# Patient Record
Sex: FEMALE | Race: BLACK OR AFRICAN AMERICAN | NOT HISPANIC OR LATINO | Employment: FULL TIME | URBAN - METROPOLITAN AREA
[De-identification: names, ages, dates, MRNs, and addresses within clinical notes are randomized per-mention and may not be internally consistent; named-entity substitution may affect disease eponyms.]

---

## 2023-06-05 ENCOUNTER — TELEPHONE (OUTPATIENT)
Dept: NEUROSURGERY | Facility: CLINIC | Age: 63
End: 2023-06-05
Payer: COMMERCIAL

## 2023-06-05 NOTE — TELEPHONE ENCOUNTER
Received [facility] [place] referral. Awaiting medical records and imaging.    Keiry Castañeda, RN, CMSRN  RN Navigator  [facility]

## 2023-06-23 ENCOUNTER — TELEPHONE (OUTPATIENT)
Dept: NEUROSURGERY | Facility: CLINIC | Age: 63
End: 2023-06-23
Payer: COMMERCIAL

## 2023-06-29 ENCOUNTER — TELEPHONE (OUTPATIENT)
Dept: SPINE | Facility: CLINIC | Age: 63
End: 2023-06-29
Payer: COMMERCIAL

## 2023-06-30 ENCOUNTER — TELEPHONE (OUTPATIENT)
Dept: NEUROSURGERY | Facility: CLINIC | Age: 63
End: 2023-06-30
Payer: COMMERCIAL

## 2023-06-30 RX ORDER — ESTRADIOL 2 MG/1
2 TABLET ORAL DAILY
COMMUNITY
Start: 2023-06-15

## 2023-06-30 RX ORDER — MELOXICAM 15 MG/1
15 TABLET ORAL DAILY
COMMUNITY
Start: 2023-06-26
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
## 2023-07-11 ENCOUNTER — TELEPHONE (OUTPATIENT)
Dept: NEUROSURGERY | Facility: CLINIC | Age: 63
End: 2023-07-11
Payer: COMMERCIAL

## 2023-07-11 NOTE — TELEPHONE ENCOUNTER
spoke to patient who stated she spoke to her primary care doctor Dr. Macedo office and they will agree to participate in [facility] program. Will fax PCP letter to Dr. Alhaji Macedo  [phone]/ fax [phone]- once received will schedule to bring patient in for evaluation.    Keiry Castañeda, RN, CMSRN  RN Navigator  [facility]

## 2023-07-20 ENCOUNTER — TELEPHONE (OUTPATIENT)
Dept: NEUROSURGERY | Facility: CLINIC | Age: 63
End: 2023-07-20
Payer: COMMERCIAL

## 2023-07-24 ENCOUNTER — TELEPHONE (OUTPATIENT)
Dept: NEUROSURGERY | Facility: CLINIC | Age: 63
End: 2023-07-24
Payer: COMMERCIAL

## 2023-07-25 ENCOUNTER — TELEPHONE (OUTPATIENT)
Dept: ORTHOPEDICS | Facility: CLINIC | Age: 63
End: 2023-07-25
Payer: COMMERCIAL

## 2023-07-25 NOTE — TELEPHONE ENCOUNTER
Spoke to patient about [facility] spine initial eval dates. Offered 8/6-8/9 and 8/8-8/12. Patient would like to come in 8/8-8/12. Patient will drive. Will submit POC to [employer] for authorization.    Keiry Castañeda, RN, CMSRN  RN Navigator  [facility]

## 2023-07-28 ENCOUNTER — TELEPHONE (OUTPATIENT)
Dept: ORTHOPEDICS | Facility: CLINIC | Age: 63
End: 2023-07-28
Payer: COMMERCIAL

## 2023-07-28 DIAGNOSIS — M54.9 DORSALGIA: Primary | ICD-10-CM

## 2023-08-09 ENCOUNTER — OFFICE VISIT (OUTPATIENT)
Dept: ORTHOPEDICS | Facility: CLINIC | Age: 63
End: 2023-08-09
Payer: COMMERCIAL

## 2023-08-09 ENCOUNTER — HOSPITAL ENCOUNTER (OUTPATIENT)
Dept: RADIOLOGY | Facility: HOSPITAL | Age: 63
Discharge: HOME | End: 2023-08-09
Attending: ORTHOPAEDIC SURGERY
Payer: COMMERCIAL

## 2023-08-09 VITALS — BODY MASS INDEX: 30.77 KG/M2 | HEIGHT: 60 IN | WEIGHT: 156.75 LBS

## 2023-08-09 DIAGNOSIS — M43.10 SPONDYLOLISTHESIS, ACQUIRED: Primary | ICD-10-CM

## 2023-08-09 DIAGNOSIS — M54.9 DORSALGIA: ICD-10-CM

## 2023-08-09 PROCEDURE — 72114 X-RAY EXAM L-S SPINE BENDING: CPT | Mod: TC

## 2023-08-09 PROCEDURE — 99204 PR OFFICE/OUTPT VISIT, NEW, LEVL IV, 45-59 MIN: ICD-10-PCS | Mod: S$GLB,COE,, | Performed by: ORTHOPAEDIC SURGERY

## 2023-08-09 PROCEDURE — 72114 X-RAY EXAM L-S SPINE BENDING: CPT | Mod: 26,COE,, | Performed by: RADIOLOGY

## 2023-08-09 PROCEDURE — 99999 PR PBB SHADOW E&M-EST. PATIENT-LVL II: CPT | Mod: PBBFAC,COE,, | Performed by: ORTHOPAEDIC SURGERY

## 2023-08-09 PROCEDURE — 99204 OFFICE O/P NEW MOD 45 MIN: CPT | Mod: S$GLB,COE,, | Performed by: ORTHOPAEDIC SURGERY

## 2023-08-09 PROCEDURE — 72114 XR LUMBAR SPINE 5 VIEW WITH FLEX AND EXT: ICD-10-PCS | Mod: 26,COE,, | Performed by: RADIOLOGY

## 2023-08-09 PROCEDURE — 99999 PR PBB SHADOW E&M-EST. PATIENT-LVL II: ICD-10-PCS | Mod: PBBFAC,COE,, | Performed by: ORTHOPAEDIC SURGERY

## 2023-08-09 NOTE — PROGRESS NOTES
Subjective:     Patient ID: Rocio Schroeder is a 63 y.o. female.    Chief Complaint: No chief complaint on file.    Ms Schroeder is a 64 yo female [facility] patient here for evaluation of low back pain.  She has had back pain off and on 5-6 years.  She feels like slowly getting worse.  The pain has gotten more constant.  The pain is in the middle of the lower back and down the side of the left leg to the toes.  The right side hurts in the hip.  The back pain is constant.  The leg pain comes and goes.  The pain is worse with bend and lift, vacuuming.  The pain can be in any position.  The pain is a burning throbbing and gnawing pain.  The back surgery was in 2007 with some relief of her pain and then came back several years later.  Pain pain 7/10 now, worst 10/10 after a couple of days or work.  Best 4/10 lying own on back, she has pain lying on each hip.  She has had injections with no relief.  She has done PT last year.  She does not feel like it helped.  She did not continue HEP because she did not feel like it helpes    MRI lumbar 12/3/2022  FINDINGS:L4 and L5 laminectomies have been performed. There is a small chronic  seroma posterior to L4-5. Posterior fusion with bilateral pedicle screws and  bars at L4-5. No suspicious bony lesion. The conus medullaris is normal in  configuration and located at L1-2.  L5-Sl: There is moderate loss of disc height. There is mild disc bulging into  both foramina. There is severe bilateral apophyseal joint arthritis. There is  moderate bilateral L5 neural foraminal stenosis.  L4-5: There is good decompression. No stenosis.  L3-4: There is severe bilateral apophyseal joint arthritis. There is moderate  loss of disc height and disc desiccation. There is grade 1 spondylolisthesis.  Disc bulging is minimal with mild right paracentral bulging cephalad posterior  to the L3 vertebral body. There is mild bilateral L3 neural foraminal stenosis.  L2-3: Within normal limits.  L1-2: Within normal  limits.  T12-Ll: Within normal limits.  Disc disease and disc bulging present from T9-T10 through T11-12 there is  left-sided neural foraminal stenosis at each of those levels.  IMPRESSION:  1. Status post L4-5 laminectomies and posterior fusion. Good decompression.  2. There is moderate degenerative disc disease at L5-S1 and severe bilateral  apophyseal joint arthritis. Moderate bilateral L5 neural foraminal stenosis.  3. Moderate degenerative disc disease at L3-4 and severe bilateral apophyseal  joint arthritis. There is grade 1 spondylolisthesis. Mild bilateral L3 neural  foraminal stenosis.  4. Degenerative disc disease and disc bulging at the lower thoracic levels with  left T9, T10 and T11 neural foraminal stenosis.    No past medical history on file.    No past surgical history on file.    No family history on file.      Social History    Socioeconomic History      Marital status: Unknown    Tobacco Use      Smoking status: Never      Smokeless tobacco: Never      Current Outpatient Medications:  estradioL (ESTRACE) 2 MG tablet, Take 2 mg by mouth., Disp: , Rfl:   meloxicam (MOBIC) 15 MG tablet, Take 15 mg by mouth., Disp: , Rfl:     No current facility-administered medications for this visit.      Review of patient's allergies indicates:  No Known Allergies          Review of Systems   Constitutional: Negative for weight gain and weight loss.   Cardiovascular:  Negative for chest pain.   Respiratory:  Negative for shortness of breath.    Musculoskeletal:  Positive for back pain. Negative for joint pain and joint swelling.   Gastrointestinal:  Negative for abdominal pain, bowel incontinence, nausea and vomiting.   Genitourinary:  Negative for bladder incontinence.   Neurological:  Positive for paresthesias (left leg). Negative for numbness.        Objective:     General: Rocio is well-developed, well-nourished, appears stated age, in no acute distress, alert and oriented to time, place and person.      General    Vitals reviewed.  Constitutional: She is oriented to person, place, and time. She appears well-developed and well-nourished.   HENT:   Head: Normocephalic and atraumatic.   Pulmonary/Chest: Effort normal.   Neurological: She is alert and oriented to person, place, and time.   Psychiatric: She has a normal mood and affect. Her behavior is normal. Judgment and thought content normal.     General Musculoskeletal Exam   Gait: normal     Right Ankle/Foot Exam     Tests   Heel Walk: able to perform  Tiptoe Walk: able to perform    Left Ankle/Foot Exam     Tests   Heel Walk: able to perform  Tiptoe Walk: able to perform      Right Hip Exam     Tenderness  Also right side trochanteric tenderness.  Left Hip Exam     Tenderness  Also left side trochanteric tenderness.      Back (L-Spine & T-Spine) / Neck (C-Spine) Exam     Tenderness   The patient is tender to palpation of the right side trochanteric and left side trochanteric. Right paramedian tenderness of the Sacrum. Left paramedian tenderness of the Sacrum.     Back (L-Spine & T-Spine) Range of Motion   Extension:  20   Flexion:  80   Lateral bend right:  20   Lateral bend left:  20     Spinal Sensation   Right Side Sensation  C-Spine Level: normal   L-Spine Level: normal  S-Spine Level: normal  Left Side Sensation  C-Spine Level: normal  L-Spine Level: normal  S-Spine Level: normal    Back (L-Spine & T-Spine) Tests   Right Side Tests  Straight leg raise:        Sitting SLR: > 70 degrees    Left Side Tests  Straight leg raise:       Sitting SLR: > 70 degrees      Other   She has no scoliosis .  Spinal Kyphosis:  Absent      Muscle Strength   Right Upper Extremity   Biceps: 5/5   Deltoid:  5/5  Triceps:  5/5  Wrist extension: 5/5   Finger Flexors:  5/5  Left Upper Extremity  Biceps: 5/5   Deltoid:  5/5  Triceps:  5/5  Wrist extension: 5/5   Finger Flexors:  5/5  Right Lower Extremity   Hip Flexion: 5/5   Quadriceps:  5/5   Anterior tibial:  5/5   EHL:   5/5  Left Lower Extremity   Hip Flexion: 5/5   Quadriceps:  5/5   Anterior tibial:  5/5   EHL:  5/5    Reflexes     Left Side  Biceps:  2+  Triceps:  2+  Brachioradialis:  2+  Achilles:  2+  Left Tariq's Sign:  Absent  Babinski Sign:  absent  Quadriceps:  2+    Right Side   Biceps:  2+  Triceps:  2+  Brachioradialis:  2+  Achilles:  2+  Right Tariq's Sign:  absent  Babinski Sign:  absent  Quadriceps:  2+    Vascular Exam     Right Pulses        Carotid:                  2+    Left Pulses        Carotid:                  2+          Assessment:     1. Chronic bilateral low back pain with left-sided sciatica    2. Osteoarthritis of spine with radiculopathy, lumbar region    3. DDD (degenerative disc disease), lumbar         Plan:     Orders Placed This Encounter    Ambulatory referral/consult to [facility]     We discussed back pain and the nature of back pain.  We discussed that it is not one thing that causes the pain but an accumulation of multiple things that we do.  MRI was reviewed and lumbar DJD and DDD  We discussed posture sitting and the importance of trying to sit better.  We discussed watching posture sitting and trying to sit up and not staying in one position too long.  We discussed watching how bend and lift.  We discussed changes in daily tasks can help make the pain more tolerable and perhaps reduce number of 10/10  We discussed the benefits of therapy and exercise and continuing to move.  We discussed going back to PT and continuing HEP  Pt eval today at [facility]  Discussed surgery with Dr. Parks  Pain management evaluating and going to try L5-S1 facet      Follow-up: No follow-ups on file. If there are any questions prior to this, the patient was instructed to contact the office.

## 2023-08-09 NOTE — PROGRESS NOTES
DATE: 8/9/2023  PATIENT: Rocio Schroeder    Attending Physician: Joseph Parks M.D.    CHIEF COMPLAINT:  Lumbar spinal stenosis    HISTORY:  Rocio Schroeder is a 63 y.o. female [facility] patient here for initial evaluation of low back and left leg pain (Back - 9, Leg - 9). The pain has been present for several years. The patient describes the pain as constant and radiating down the left lower extremity.  The pain is worse with standing and walking and improved by nothing. There is left leg associated numbness and tingling. There is moderate left leg subjective weakness resulting in a fall 3 months ago. Prior treatments have included multimodal medications and epidural steroid injections.  She has a history of L4 -5 TLIF in 2007 for left lower extremity radiculopathy from which she did very well for 5-6 years before she had recurrence of her symptoms.    The Patient denies myelopathic symptoms such as handwriting changes or difficulty with buttons/coins/keys. Denies perineal paresthesias, bowel/bladder dysfunction.    PAST MEDICAL/SURGICAL HISTORY:  No past medical history on file.  No past surgical history on file.    Current Medications:   Current Outpatient Medications:     estradioL (ESTRACE) 2 MG tablet, Take 2 mg by mouth., Disp: , Rfl:     meloxicam (MOBIC) 15 MG tablet, Take 15 mg by mouth., Disp: , Rfl:     Social History:   Social History     Socioeconomic History    Marital status: Unknown        EXAM:  Ht 5' (1.524 m)   Wt 71.1 kg (156 lb 12 oz)   BMI 30.61 kg/m²     PHYSICAL EXAMINATION:    Gait: Normal station and gait, no difficulty with toe or heel walk.   Skin: Dorsal lumbar skin negative for rashes, lesions, hairy patches and surgical scars. There is moderate lumbar tenderness to palpation.  Range of motion: Lumbar range of motion is acceptable.  Spinal Balance: Global saggital and coronal spinal balance acceptable, no significant for scoliosis and kyphosis.  Musculoskeletal: No  "pain with the range of motion of the bilateral hips. No trochanteric tenderness to palpation.  Vascular: Bilateral lower extremities warm and well perfused, Dorsalis pedis pulses 2+ bilaterally.  Neurological: Strength decreased in left lower extremity 4/5 and otherwise normal tone in all major motor groups in the bilateral lower extremities. Normal sensation to light touch in the L2-S1 dermatomes bilaterally.  Deep tendon reflexes symmetric in the bilateral lower extremities.  Negative Babinski bilaterally. Straight leg raise negative bilaterally.    IMAGING:      Today I personally reviewed AP, Lat and Flex/Ex  upright L-spine that demonstrate grade 1 L3/L4 anterolisthesis.  The disc spaces are narrowed between L3 and S1 vertebral segments.  Prior L4-5 hardware appears to be intact.      Body mass index is 30.61 kg/m².  No results found for: "HGBA1C"    ASSESSMENT/PLAN:    Rocio was seen today for low-back pain.    Diagnoses and all orders for this visit:    Spondylolisthesis, acquired      No follow-ups on file.    Lumbar spinal stenosis    Patient presents for initial evaluation and discussion of her lumbar spinal stenosis.  She is developed adjacent segment pathology proximal to her prior fusion.  Today we discussed surgical options as well as risks benefits and alternatives of surgery versus nonoperative management.  Patient wishes to contemplate her options and will let us know if she decides to schedule surgery.    "

## 2023-08-10 ENCOUNTER — OFFICE VISIT (OUTPATIENT)
Dept: SPINE | Facility: CLINIC | Age: 63
End: 2023-08-10
Payer: COMMERCIAL

## 2023-08-10 ENCOUNTER — OFFICE VISIT (OUTPATIENT)
Dept: SPINE | Facility: CLINIC | Age: 63
End: 2023-08-10
Attending: PHYSICAL MEDICINE & REHABILITATION
Payer: COMMERCIAL

## 2023-08-10 ENCOUNTER — CLINICAL SUPPORT (OUTPATIENT)
Dept: REHABILITATION | Facility: OTHER | Age: 63
End: 2023-08-10
Payer: COMMERCIAL

## 2023-08-10 ENCOUNTER — HOSPITAL ENCOUNTER (OUTPATIENT)
Facility: OTHER | Age: 63
Discharge: HOME | End: 2023-08-10
Attending: ANESTHESIOLOGY | Admitting: ANESTHESIOLOGY
Payer: COMMERCIAL

## 2023-08-10 VITALS
WEIGHT: 157 LBS | SYSTOLIC BLOOD PRESSURE: 158 MMHG | TEMPERATURE: 99 F | OXYGEN SATURATION: 98 % | BODY MASS INDEX: 29.64 KG/M2 | HEART RATE: 76 BPM | HEIGHT: 61 IN | DIASTOLIC BLOOD PRESSURE: 91 MMHG | RESPIRATION RATE: 16 BRPM

## 2023-08-10 VITALS
DIASTOLIC BLOOD PRESSURE: 86 MMHG | HEART RATE: 70 BPM | HEART RATE: 70 BPM | SYSTOLIC BLOOD PRESSURE: 185 MMHG | DIASTOLIC BLOOD PRESSURE: 86 MMHG | SYSTOLIC BLOOD PRESSURE: 185 MMHG

## 2023-08-10 DIAGNOSIS — G89.29 CHRONIC BILATERAL LOW BACK PAIN, UNSPECIFIED WHETHER SCIATICA PRESENT: ICD-10-CM

## 2023-08-10 DIAGNOSIS — M47.899 OTHER OSTEOARTHRITIS OF SPINE, UNSPECIFIED SPINAL REGION: Primary | ICD-10-CM

## 2023-08-10 DIAGNOSIS — M25.69 DECREASED RANGE OF MOTION OF TRUNK AND BACK: ICD-10-CM

## 2023-08-10 DIAGNOSIS — M47.26 OSTEOARTHRITIS OF SPINE WITH RADICULOPATHY, LUMBAR REGION: ICD-10-CM

## 2023-08-10 DIAGNOSIS — G89.4 CHRONIC PAIN DISORDER: ICD-10-CM

## 2023-08-10 DIAGNOSIS — G89.29 CHRONIC BILATERAL LOW BACK PAIN WITH LEFT-SIDED SCIATICA: ICD-10-CM

## 2023-08-10 DIAGNOSIS — M47.816 SPONDYLOSIS OF LUMBAR SPINE: Primary | ICD-10-CM

## 2023-08-10 DIAGNOSIS — M54.50 CHRONIC BILATERAL LOW BACK PAIN, UNSPECIFIED WHETHER SCIATICA PRESENT: ICD-10-CM

## 2023-08-10 DIAGNOSIS — M47.816 SPONDYLOSIS WITHOUT MYELOPATHY OR RADICULOPATHY, LUMBAR REGION: ICD-10-CM

## 2023-08-10 DIAGNOSIS — M54.42 CHRONIC BILATERAL LOW BACK PAIN WITH LEFT-SIDED SCIATICA: ICD-10-CM

## 2023-08-10 DIAGNOSIS — M54.42 CHRONIC BILATERAL LOW BACK PAIN WITH LEFT-SIDED SCIATICA: Primary | ICD-10-CM

## 2023-08-10 DIAGNOSIS — G89.29 CHRONIC BILATERAL LOW BACK PAIN WITH LEFT-SIDED SCIATICA: Primary | ICD-10-CM

## 2023-08-10 DIAGNOSIS — G89.29 CHRONIC PAIN: ICD-10-CM

## 2023-08-10 DIAGNOSIS — M51.36 DDD (DEGENERATIVE DISC DISEASE), LUMBAR: ICD-10-CM

## 2023-08-10 PROCEDURE — 97161 PT EVAL LOW COMPLEX 20 MIN: CPT

## 2023-08-10 PROCEDURE — 64483 NJX AA&/STRD TFRM EPI L/S 1: CPT | Mod: 50,COE,, | Performed by: ANESTHESIOLOGY

## 2023-08-10 PROCEDURE — 99204 OFFICE O/P NEW MOD 45 MIN: CPT | Mod: S$GLB,COE,, | Performed by: ANESTHESIOLOGY

## 2023-08-10 PROCEDURE — 25000003 PHARM REV CODE 250: Performed by: STUDENT IN AN ORGANIZED HEALTH CARE EDUCATION/TRAINING PROGRAM

## 2023-08-10 PROCEDURE — 25000003 PHARM REV CODE 250: Performed by: ANESTHESIOLOGY

## 2023-08-10 PROCEDURE — 99999 PR PBB SHADOW E&M-EST. PATIENT-LVL III: CPT | Mod: PBBFAC,COE,, | Performed by: PHYSICAL MEDICINE & REHABILITATION

## 2023-08-10 PROCEDURE — 64483 NJX AA&/STRD TFRM EPI L/S 1: CPT | Mod: RT | Performed by: ANESTHESIOLOGY

## 2023-08-10 PROCEDURE — 99999 PR PBB SHADOW E&M-EST. PATIENT-LVL III: ICD-10-PCS | Mod: PBBFAC,COE,, | Performed by: PHYSICAL MEDICINE & REHABILITATION

## 2023-08-10 PROCEDURE — 99999 PR PBB SHADOW E&M-EST. PATIENT-LVL III: ICD-10-PCS | Mod: PBBFAC,COE,, | Performed by: ANESTHESIOLOGY

## 2023-08-10 PROCEDURE — 99204 PR OFFICE/OUTPT VISIT, NEW, LEVL IV, 45-59 MIN: ICD-10-PCS | Mod: S$GLB,COE,, | Performed by: ANESTHESIOLOGY

## 2023-08-10 PROCEDURE — 99204 OFFICE O/P NEW MOD 45 MIN: CPT | Mod: S$GLB,COE,, | Performed by: PHYSICAL MEDICINE & REHABILITATION

## 2023-08-10 PROCEDURE — 64483 PR EPIDURAL INJ, ANES/STEROID, TRANSFORAMINAL, LUMB/SACR, SNGL LEVL: ICD-10-PCS | Mod: 50,COE,, | Performed by: ANESTHESIOLOGY

## 2023-08-10 PROCEDURE — 99999 PR PBB SHADOW E&M-EST. PATIENT-LVL III: CPT | Mod: PBBFAC,COE,, | Performed by: ANESTHESIOLOGY

## 2023-08-10 PROCEDURE — 63600175 PHARM REV CODE 636 W HCPCS: Performed by: ANESTHESIOLOGY

## 2023-08-10 PROCEDURE — 25500020 PHARM REV CODE 255: Performed by: ANESTHESIOLOGY

## 2023-08-10 PROCEDURE — 97110 THERAPEUTIC EXERCISES: CPT

## 2023-08-10 PROCEDURE — 99204 PR OFFICE/OUTPT VISIT, NEW, LEVL IV, 45-59 MIN: ICD-10-PCS | Mod: S$GLB,COE,, | Performed by: PHYSICAL MEDICINE & REHABILITATION

## 2023-08-10 RX ORDER — LIDOCAINE HYDROCHLORIDE 20 MG/ML
INJECTION, SOLUTION INFILTRATION; PERINEURAL
Status: DISCONTINUED | OUTPATIENT
Start: 2023-08-10 | End: 2023-08-10 | Stop reason: HOSPADM

## 2023-08-10 RX ORDER — MIDAZOLAM HYDROCHLORIDE 1 MG/ML
INJECTION INTRAMUSCULAR; INTRAVENOUS
Status: DISCONTINUED | OUTPATIENT
Start: 2023-08-10 | End: 2023-08-10 | Stop reason: HOSPADM

## 2023-08-10 RX ORDER — FENTANYL CITRATE 50 UG/ML
INJECTION, SOLUTION INTRAMUSCULAR; INTRAVENOUS
Status: DISCONTINUED | OUTPATIENT
Start: 2023-08-10 | End: 2023-08-10 | Stop reason: HOSPADM

## 2023-08-10 RX ORDER — BUPIVACAINE HYDROCHLORIDE 2.5 MG/ML
INJECTION, SOLUTION EPIDURAL; INFILTRATION; INTRACAUDAL
Status: DISCONTINUED | OUTPATIENT
Start: 2023-08-10 | End: 2023-08-10 | Stop reason: HOSPADM

## 2023-08-10 RX ORDER — SODIUM CHLORIDE 9 MG/ML
INJECTION, SOLUTION INTRAVENOUS CONTINUOUS
Status: DISCONTINUED | OUTPATIENT
Start: 2023-08-10 | End: 2023-08-10 | Stop reason: HOSPADM

## 2023-08-10 RX ORDER — DEXAMETHASONE SODIUM PHOSPHATE 10 MG/ML
INJECTION INTRAMUSCULAR; INTRAVENOUS
Status: DISCONTINUED | OUTPATIENT
Start: 2023-08-10 | End: 2023-08-10 | Stop reason: HOSPADM

## 2023-08-10 NOTE — OP NOTE
Therapeutic Lumbar Facet Joint Injection under Fluoroscopy     The procedure, risks, benefits, and options were discussed with the patient. There are no contraindications to the procedure. The patent expressed understanding and agreed to the procedure. Informed written consent was obtained prior to the start of the procedure and can be found in the patient's chart.    PATIENT NAME: Rocio Schroeder   MRN: [id number]     DATE OF PROCEDURE: 08/10/2023    PROCEDURE: Bilateral L5/S1 Lumbar Facet Joint Injection under Fluoroscopy      PRE-OP DIAGNOSIS: Spondylosis of lumbar spine [M47.816]  Chronic bilateral low back pain, unspecified whether sciatica present [M54.50, G89.29] Lumbar spondylosis [M47.816]    POST-OP DIAGNOSIS: Same    PHYSICIAN: Rashid Cook MD    ASSISTANTS:  Thomas Moore MD    MEDICATIONS INJECTED: Preservative-free Decadron 10mg with 1cc of Bupivacaine 0.25%    LOCAL ANESTHETIC INJECTED: Xylocaine 2%     SEDATION: Versed 2mg and Fentanyl 50mcg                                                                                                                                                                                     Conscious sedation ordered by M.D. Patient re-evaluation prior to administration of conscious sedation. No changes noted in patient's status from initial evaluation. The patient's vital signs were monitored by RN and patient remained hemodynamically stable throughout the procedure.    Event Time In   Sedation Start 1300   Sedation End 1307       ESTIMATED BLOOD LOSS: None    COMPLICATIONS: None    TECHNIQUE: Time-out was performed to identify the patient and procedure to be performed. With the patient laying in a prone position, the surgical area was prepped and draped in the usual sterile fashion using ChloraPrep and a fenestrated drape. The level was determined under fluoroscopy guidance. Skin anesthesia was achieved by injecting Lidocaine 2% over the injection sites. A 25  gauge, 3.5 inch needle was introduced to each level using AP, lateral and/or contralateral oblique fluoroscopic imaging. Contrast dye  Omnipaque (300mg/mL) was given until dye spread was in the distribution of the facet joint without any intravascular spread.  After negative aspiration for blood or CSF was confirmed, 1 mL of the medication mixture listed above was injected slowly into each joint with displacement of the contrast confirming that the medication went into the distribution of the facet joints. The needles were removed and bleeding was nil. A sterile dressing was applied. No specimens collected. The patient tolerated the procedure well.    The patient was monitored after the procedure in the recovery area. They were given post-procedure and discharge instructions to follow at home. The patient was discharged in a stable condition.    I reviewed and edited the fellow's note. I conducted my own interview and physical examination. I agree with the findings. I was present and supervising all critical portions of the procedure.    MD Rashid Jacobo MD

## 2023-08-10 NOTE — DISCHARGE INSTRUCTIONS

## 2023-08-10 NOTE — PROGRESS NOTES
Chronic Pain - New Consult    Referring Physician: No ref. provider found      Chief Complaint: LBP       SUBJECTIVE:    Rocio Schroeder presents to the clinic for the evaluation of lower back pain. The pain started a few years after undergoing L4-5 posterior fusion in 2007. She initially did find some relief of her lower back and left leg pain after surgery but symptoms gradually returned. The lower back pain is much more bothersome than the left leg pain. The former is located at the lumbosacral junction on both sides, worse on the left, just below her surgical scars. It is paramedian and radiates to the upper buttocks. It is worsened by prolong standing, walking, sitting, and lying flat. It is relieved briefly by position changes. Her left leg pain is intermittent, usually occurring with prolonged walking or leaning forward (to do the dishes, for example). The leg pain sometimes has paresthesias associated but they are not constant. The left leg will feel weak when the pain is at its worst. Pain pain 7/10 now, worst 10/10 after a couple of days or work.  At its best, the pain is 4/10.  She has had two injections under a c-arm which sound like TFESIs as well as a myriad of injections done in the clinic without imaging guidance (likely trigger point injections). None of these helped for more than a day or two.  She attended PT last year.  She does not feel like it helped.  She did not continue HEP because she did not feel like it helped.    Patient denies urinary incontinence, bowel incontinence, significant motor weakness, and loss of sensations.    Physical Therapy/Home Exercise:   Yes, last year    Pain Disability Index Review:  No flowsheet data found.    Pain Medications:  Mobic 15mg - mild relief     report:    Not applicable    Pain Procedures:   As per HPI    Imaging:   EXAMINATION:  XR LUMBAR SPINE 5 VIEW WITH FLEX AND EXT     CLINICAL HISTORY:  Dorsalgia, unspecified     TECHNIQUE:  Five views of the  lumbar spine plus flexion extension views were performed.     COMPARISON:  None.     FINDINGS:  Posterior spinal fusion with associated pedicle screws identified at the L4/L5 level.  The position alignment is satisfactory.  There is a grade 1 L3/L4 anterolisthesis.  The disc spaces are narrowed between L3 and S1 vertebral segments.  No fracture or dislocation.  No bone destruction identified.  Mild lumbar scoliosis..    MRI lumbar 12/3/2022  FINDINGS:L4 and L5 laminectomies have been performed. There is a small chronic  seroma posterior to L4-5. Posterior fusion with bilateral pedicle screws and  bars at L4-5. No suspicious bony lesion. The conus medullaris is normal in  configuration and located at L1-2.  L5-Sl: There is moderate loss of disc height. There is mild disc bulging into  both foramina. There is severe bilateral apophyseal joint arthritis. There is  moderate bilateral L5 neural foraminal stenosis.  L4-5: There is good decompression. No stenosis.  L3-4: There is severe bilateral apophyseal joint arthritis. There is moderate  loss of disc height and disc desiccation. There is grade 1 spondylolisthesis.  Disc bulging is minimal with mild right paracentral bulging cephalad posterior  to the L3 vertebral body. There is mild bilateral L3 neural foraminal stenosis.  L2-3: Within normal limits.  L1-2: Within normal limits.  T12-Ll: Within normal limits.  Disc disease and disc bulging present from T9-T10 through T11-12 there is  left-sided neural foraminal stenosis at each of those levels.  IMPRESSION:  1. Status post L4-5 laminectomies and posterior fusion. Good decompression.  2. There is moderate degenerative disc disease at L5-S1 and severe bilateral  apophyseal joint arthritis. Moderate bilateral L5 neural foraminal stenosis.  3. Moderate degenerative disc disease at L3-4 and severe bilateral apophyseal  joint arthritis. There is grade 1 spondylolisthesis. Mild bilateral L3 neural  foraminal stenosis.  4.  Degenerative disc disease and disc bulging at the lower thoracic levels with  left T9, T10 and T11 neural foraminal stenosis.        Past Medical History:   Diagnosis Date    Headache      Past Surgical History:   Procedure Laterality Date    HYSTERECTOMY       Social History     Socioeconomic History    Marital status: Unknown   Tobacco Use    Smoking status: Never    Smokeless tobacco: Never     No family history on file.    Review of patient's allergies indicates:  No Known Allergies    Current Outpatient Medications   Medication Sig    estradioL (ESTRACE) 2 MG tablet Take 2 mg by mouth.    meloxicam (MOBIC) 15 MG tablet Take 15 mg by mouth.     No current facility-administered medications for this visit.       REVIEW OF SYSTEMS:  Review of Systems   Constitutional: Negative.    HENT: Negative.     Eyes: Negative.    Respiratory: Negative.     Cardiovascular: Negative.    Gastrointestinal: Negative.    Genitourinary: Negative.    Musculoskeletal:  Positive for back pain and myalgias. Negative for falls.   Skin: Negative.    Neurological: Negative.    Endo/Heme/Allergies: Negative.    Psychiatric/Behavioral: Negative.         OBJECTIVE:    BP (!) 185/86   Pulse 70     PHYSICAL EXAMINATION:    General appearance: Well appearing, in no acute distress, alert and oriented x3.  Psych:  Mood and affect appropriate.  Skin: Skin color, texture, turgor normal, no rashes or lesions, in both upper and lower body.  Head/face:  Normocephalic, atraumatic.   Lumbar: ROM is restricted in extension more than flexion. Ipsilateral pain with rotation. Pain on extension and facet loading, worse on left. TTP at bilateral lumbosacral junction and superior aspects of SIJs. Also tender on GTBs (not recreating her normal pain). Lower SIJs nontender.  Extremities: Peripheral joint ROM is full and pain free without obvious instability or laxity in all four extremities. No deformities, edema, or skin discoloration.  Neuro: Strength is full  in the L2-S1 myotomes bilaterally. Sensation is intact in the L2-S1 dermatomes bilaterally. Reflexes are intact and symmetric BLE. No clonus. Flexor plantar responses.  Special maneuvers:   Facet loading positive b/l  SLR negative b/l  MINE negative b/l  FADIR negative b/l  SI compression positive  SI distraction negative   Femoral nerve stretch negative b/l  Munira's positive on left      ASSESSMENT: 63 y.o. year old female with lumbosacral and LLE pain, consistent with symptomatic facet arthropathy at the L5/S1 joints below her fusion, correlating with edema seen in the joints on MRI (images pasted above).      1. Spondylosis of lumbar spine  Procedure Order to Pain Management      2. Chronic bilateral low back pain, unspecified whether sciatica present  Procedure Order to Pain Management      3. Chronic pain disorder            PLAN:     - I have stressed the importance of physical activity and a home exercise plan to help with pain and improve health.  - Patient can continue with medications for now since they are providing benefits, using them appropriately, and without side effects.  - Schedule for bilateral L5/S1 facet joint injections. Patient has active inflammation in these joints seen on MRI.  - We may consider SCS in the future.  - RTC for procedure    The above plan and management options were discussed at length with patient. Patient is in agreement with the above and verbalized understanding. It will be communicated with the referring physician via electronic record, fax, or mail.    Bryson Moore  08/10/2023      I have reviewed and concur with the resident's history, physical, assessment, and plan.  I have personally interviewed and examined the patient at bedside.  See below addendum for my evaluation and additional findings.    Rashid Cook MD

## 2023-08-10 NOTE — DISCHARGE SUMMARY
Discharge Note  Short Stay      SUMMARY     Admit Date: 8/10/2023    Attending Physician: Rashid Cook      Discharge Physician: Bryson Moore      Discharge Date: 8/10/2023 12:52 PM    Procedure(s) (LRB):  FACET JOINT INJECTION BILATERAL L5/S1 *[facility]* (Bilateral)    Final Diagnosis: Spondylosis of lumbar spine [M47.816]  Chronic bilateral low back pain, unspecified whether sciatica present [M54.50, G89.29]    Disposition: Home or self care    Patient Instructions:   Current Discharge Medication List        CONTINUE these medications which have NOT CHANGED    Details   estradioL (ESTRACE) 2 MG tablet Take 2 mg by mouth.      meloxicam (MOBIC) 15 MG tablet Take 15 mg by mouth.                 Discharge Diagnosis: Spondylosis of lumbar spine [M47.816]  Chronic bilateral low back pain, unspecified whether sciatica present [M54.50, G89.29]  Condition on Discharge: Stable with no complications to procedure   Diet on Discharge: Same as before.  Activity: as per instruction sheet.  Discharge to: Home with a responsible adult.  Follow up: 2-4 weeks       Please call my office or pager at [phone] if experienced any weakness or loss of sensation, fever > 101.5, pain uncontrolled with oral medications, persistent nausea/vomiting/or diarrhea, redness or drainage from the incisions, or any other worrisome concerns. If physician on call was not reached or could not communicate with our office for any reason please go to the nearest emergency department

## 2023-08-10 NOTE — PLAN OF CARE
"    [facility]- PH[place] [facility] [place]: Rocio Schroeder  Clinic Number: [id number]    Therapy Diagnosis:   Encounter Diagnosis   Name Primary?    Decreased range of motion of trunk and back        Physician: No ref. provider found    Physician Orders: PT Eval and Treat   Medical Diagnosis from Referral: M54.42,G89.29 (ICD-10-CM) - Chronic bilateral low back pain with left-sided sciatica  Evaluation Date: 8/10/2023  Authorization Period Expiration: 08/09/24  Plan of Care Expiration: [place] [facility] [place]  Visit # / Visits authorized: 1/ 1    Time In: 10:00 am   Time Out: 11:00 am  Total Billable Time: 60 minutes    Precautions: Standard and Fall   L4 -5 TLIF 2007    Pattern of pain determined: lumbar derangement flexion responder       Subjective   Date of onset: chronic   History of current condition - Rocio reports: constant B LBP /c progression down LLE and R hip.  Patient note R hip and LLE sx are intermittent and the LBP varies in intensity through the day.  Patient describe LBP as dull "toothache" /c moments of sharp pain /c movement and LLE sx as "tingle and burn".  Patient note some LB stiffness in AM but pain more present /c activity in day.  Patient report an inc in LB intensity over the last year.   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  At home patient note inc discomfort /c sweeping, mopping and discomfort /c rising from chair /p extended sitting.    Patient note sx relief /s sitting and when on the move.  Patient admit "boom bust" cycle characteristics.    Patient /c 2021 PT episode /c initial sx improvement but leading to inc discomfort /c exercises (muscle activation).    Patient scheduled for B facet joint injection L5/S1 but is unsure if she will receive this due to insurance questions and past hx of little sx change from injections.       Per MD report   Ms Schroeder is a 64 [place] [facility] [place] evaluation of low back pain.  She has had " back pain off and on 5-6 years.  She feels like slowly getting worse.  The pain has gotten more constant.  The pain is in the middle of the lower back and down the side of the left leg to the toes.  The right side hurts in the hip.  The back pain is constant.  The leg pain comes and goes.  The pain is worse with bend and lift, vacuuming.  The pain can be in any position.  The pain is a burning throbbing and gnawing pain.  The back surgery was in 2007 with some relief of her pain and then came back several years later.  Pain pain 7/10 now, worst 10/10 after a couple of days or work.  Best 4/10 lying own on back, she has pain lying on each hip.  She has had injections with no relief.  She has done PT last year.  She does not feel like it helped.  She did not continue HEP because she did not feel like it helpes       Medical History:   Past Medical History:   Diagnosis Date    Headache        Surgical History:   Rocio Schroeder  has a past surgical history that includes Hysterectomy.    Medications:   Rocio has a current medication list which includes the following prescription(s): estradiol and meloxicam.    Allergies:   Review of patient's allergies indicates:  No Known Allergies     Imaging:  Per MD report  EXAMINATION:  XR LUMBAR SPINE 5 VIEW WITH FLEX AND EXT       FINDINGS:  Posterior spinal fusion with associated pedicle screws identified at the L4/L5 level.  The position alignment is satisfactory.  There is a grade 1 L3/L4 anterolisthesis.  The disc spaces are narrowed between L3 and S1 vertebral segments.  No fracture or dislocation.  No bone destruction identified.  Mild lumbar scoliosis.     Impression:  See above     Electronically signed by: Paul Donnelly MD  Date:                                            08/09/2023  Time:                                           10:50        MRI lumbar 12/3/2022  FINDINGS:L4 and L5 laminectomies have been performed. There is a small chronic  seroma posterior to  L4-5. Posterior fusion with bilateral pedicle screws and  bars at L4-5. No suspicious bony lesion. The conus medullaris is normal in  configuration and located at L1-2.  L5-Sl: There is moderate loss of disc height. There is mild disc bulging into  both foramina. There is severe bilateral apophyseal joint arthritis. There is  moderate bilateral L5 neural foraminal stenosis.  L4-5: There is good decompression. No stenosis.  L3-4: There is severe bilateral apophyseal joint arthritis. There is moderate  loss of disc height and disc desiccation. There is grade 1 spondylolisthesis.  Disc bulging is minimal with mild right paracentral bulging cephalad posterior  to the L3 vertebral body. There is mild bilateral L3 neural foraminal stenosis.  L2-3: Within normal limits.  L1-2: Within normal limits.  T12-Ll: Within normal limits.  Disc disease and disc bulging present from T9-T10 through T11-12 there is  left-sided neural foraminal stenosis at each of those levels.  IMPRESSION:  1. Status post L4-5 laminectomies and posterior fusion. Good decompression.  2. There is moderate degenerative disc disease at L5-S1 and severe bilateral  apophyseal joint arthritis. Moderate bilateral L5 neural foraminal stenosis.  3. Moderate degenerative disc disease at L3-4 and severe bilateral apophyseal  joint arthritis. There is grade 1 spondylolisthesis. Mild bilateral L3 neural  foraminal stenosis.  4. Degenerative disc disease and disc bulging at the lower thoracic levels with  left T9, T10 and T11 neural foraminal stenosis.       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[employer], graveyard shift   Leisure: reading, yardwork      Prior Level of Function:   working pain free, able to tolerate yardwork  Current Level of Function:  difficulty /c household chores, dec standing/walking tolerance   DME  "owned/used: recumbent bike         Pain:  Current 6/10, worst 10/10 , best 4/10   Location: bilateral back, LLE to ankle   Description: varied LB - Aching and Dull to sharp  LLE - tingle, burn   Aggravating Factors: Standing, Lifting, and Getting out of bed/chair  Easing Factors:  laying supine, bending over stretch from past PT    Disturbed Sleep: Y        Pattern of pain questions:  1.  Where is your pain the worst?   LB constant   2.  Is your pain constant or intermittent? LB  constant LLE intermittent   3.  Does bending forward make your typical pain worse? Y  4.  Since the start of your back pain, has there been a change in your bowel or bladder? N  5.  What can't you do now that you use to be able to do? Work pain free, difficulty /c household chores         Red Flag Screening:   Cough  Sneeze  Strain: (--)  Bladder/ bowel: (--)  Falls: (+)  May 2023  Night pain: (--)  Unexplained weight loss: (--)  General health: patient report "excellent"     OBJECTIVE     Postural examination/scapula alignment:  slight fwd head  Sitting: mild slouch, feet off ground with hips back   Standing: unremarkable  Correction of posture: better with lumbar roll  Palpation:  TTP L piriformis     TTP B PSIS     MOVEMENT LOSS    ROM Loss   Flexion minimal loss   Extension minimal loss   Side bending Right minimal loss   Side bending Left minimal loss   Rotation Right minimal loss   Rotation Left minimal loss     R side glide mod loss /c R hip pain (before RFIL)   R side glide mod loss /c NO hip pain (after RFIL)       Lower Extremity Strength  Right LE  Left LE    Hip flexion: 4+/5 Hip flexion: 4+/5   Hip extension:  4-/5 Hip extension: 4-/5   Hip abduction: 4-/5 Hip abduction: 4-/5   Hip adduction:  5/5 Hip adduction:  5/5   Hip Internal rotation   4-/5 Hip Internal rotation 4-/5   Knee Flexion 5/5 Knee Flexion 5/5   Knee Extension 5/5 Knee Extension 5/5   Ankle dorsiflexion: 4+/5 Ankle dorsiflexion: 4+/5   Ankle plantarflexion: 5/5 " Ankle plantarflexion: 5/5       GAIT:  Assistive Device used: none  Level of Assistance: independent  Patient displays the following gait deviations:  no gait deviations observed.     Special Tests:   Test Name  Test Result   SI Joint Provocation Test (--)  TTP over B PSIS, (-) sacral press (+) thigh thrust   Straight Leg Raise (--)   Neural Tension Test (--)   Crossed Straight Leg Raise (--)   Walking on toes (--)   Walking on heels  (--)   Additional Tests        NEUROLOGICAL SCREENING     Sensory deficit:   BLE LT intact  L5 myotome intact       Reflexes:    Left Right   Patella Tendon absent absent   Achilles Tendon 2+ 2+   Clonus (--) (--)     REPEATED TEST MOVEMENTS:  6/10 LB  0/10 LLE  Reassess R side glide /p RFIL  better       Repeated Flexion in Standing better   Repeated Flexion in lying better inc ROM    Repeated Extension in lying  Inc / worse L LB, L hip        STATIC TESTS   6/10 0/10 LLE   Sitting slouched  no effect   Sitting erect better   BENJI better         Treatment   Treatment Time In: 10:50 am  Treatment Time Out: 11:00 am  Total Treatment time separate from Evaluation: 10 minutes      Rocio received therapeutic exercises to develop/improve posture, lumbarl ROM, strength and muscular endurance for 10 minutes including the following exercises:     HEP demonstrate include:   RFIL x 20 cue for end range    LTR prn in AM     Written Home Exercises Provided: yes.  Exercises were reviewed and Rocio was able to demonstrate them prior to the end of the session.  Rocio demonstrated fair  understanding of the education provided.     See EMR under Patient Instructions for exercises provided 8/10/2023.      Education provided:   - Patient received education regarding proper posture and body mechanics.  Patient was given [facility] handout which discusses  back education, care specifically for posture seated, standing, lifting correctly, components of exercise, tips for back pain management,  "positioning and  importance of sleep.   Information on lumbar rolls provided.  - Boo roll tried, and patient advised to obtain.  Patient encouraged to continue exploring directional preference with "Treat your Own Back Book" by Boo        Assessment   Rocio is a 63 y.o. female referred to [facility] with a medical diagnosis of M54.42,G89.29 (ICD-10-CM) - Chronic bilateral low back pain with left-sided sciatica.     Pt presents with pain, weakness, impaired mobility, decreased AROM & inability to perform ADL's as before due to current functional status.  Pt subjective report indicate sx inc /c flexion motion, however, repeated motions clear for flexion directional preference in both unloaded and loaded presentation.  Unable to elicit LLE sx during assessment, however during REIL sx /c peripheralization to L buttock.  Upon returning to RFIL patient note dec sx, indicating likely flexion directional preference.  Further evidence of this can be noted /c improved R side glide movement quality.  Therefore HEP /c flexion focus and patient educated on importance of consistent performance.  Upon physical assessment, pt demonstrates slouched posturing in sitting, mild hip weakness bilaterally, and trunk and hip mobility deficits.  Pt would benefit from LE and trunk mobility training, stability training,  improved cardiovascular and muscular endurance, neuromuscular re-education for posture, coordination, and muscular recruitment and education on positional offloading techniques to decrease the intensity and frequency of flare-ups.   Patient scheduled for B facet joint injection L5/S1, patient educated that HEP to be performed with or without receiving injection.         Pain Pattern: lumbar derangement /c flexion directional preference        Pt prognosis is Good.     Patient was seen in therapy as part of [facility] [place]  [facility].    Patient was given back care educational information verbally and " in writing.  A lumbar roll and Boo Treat your own Back book were provided.   The patient was given a home exercise program to continue with independently and was able to perform exercises in the clinic by the end of the treatment session today.      Plan of care discussed with patient: Yes  Pt's spiritual, cultural and educational needs considered and patient is agreeable to the plan of care and goals as stated below:     Anticipated Barriers for therapy: distance from [facility] location    PT Evaluation Completed? Yes    Medical necessity is demonstrated by the following problem list.    Pt presents with the following impairments:     History  Co-morbidities and personal factors that may impact the plan of care Co-morbidities:   coping style/mechanism, difficulty sleeping, and prior lumbar surgery    Personal Factors:   coping style     low   Examination  Body Structures and Functions, activity limitations and participation restrictions that may impact the plan of care Body Regions:   back  lower extremities    Body Systems:    ROM  strength  transitions  motor control    Participation Restrictions:   Patient seen 1 visit as part of [facility] program    Activity limitations:   Learning and applying knowledge  no deficits    General Tasks and Commands  no deficits    Communication  no deficits    Mobility  lifting and carrying objects  driving (bike, car, motorcycle)    Self care  no deficits    Domestic Life  shopping  cooking  doing house work (cleaning house, washing dishes, laundry)    Interactions/Relationships  no deficits    Life Areas  no deficits    Community and Social Life  community life  recreation and leisure         low   Clinical Presentation stable and uncomplicated low   Decision Making/ Complexity Score: low       GOALS: Pt is in agreement with the following goals.    Short term goals:    Provide educational information to patient regarding back care education for posture, lifting, sleeping,  "activities of daily living  Provide a home exercise program that the patient is able to perform independently to continue to work on functional goals  Provide a walking program for continued health and wellness    Plan   Outpatient physical therapy 2x week for 1-2 weeks if patient is available to participate to include the following:   - Patient education  - Therapeutic exercise  - Manual therapy  - Therapeutic activity       Therapist: Arsh Govea, PT  8/10/2023    "I certify the need for these services furnished under this plan of treatment and while under my care."    ____________________________________  Physician/Referring Practitioner    _______________  Date of Signature          "

## 2023-08-10 NOTE — PATIENT INSTRUCTIONS
WALKING PROGRAM      An apple a day keeps the Doctor away, could be replaced with, A walk a day keeps the Doctor away!  What is not to love about one of the simplest things you can do for your health?   Walking, as a form of exercise, has been shown to have numerous positive benefits.   Walking is also free, can be done anytime and anywhere, needs no special skill or equipment, and can be done at whatever level of fitness you are currently at.      Research has shown that the benefits of walking for at least 30 minutes a day may be effective to:  Improve blood pressure and blood sugar levels  Improve blood lipid profile  Improve memory and concentration  Improve sleep habits  Enhance mental wellbeing, improving mood and reducing depression  Reduce the risk of coronary heart disease ( the number 1 killer in the [place])  Reduce the risk of osteoporosis  Reduce the risk of breast and colon cancer  Reduce the risk of non-insulin dependent (type 2) diabetes  Reduce body weight and lower the risk of obesity      You need a few essentials before you hit the road.    Walking shoes.   Ensure you have lightweight, breathable, and supportive footwear.      Clothing.  Dress for comfort and the weather.  Wear layers when it is colder.  Remember sunscreen.  Water.  Take frequent sips of water when while you walk.  Ensure you drink before and after your walk.            GETTING STARTED:   Just start walking for 10 minutes, 4 times a week.  If this is very easy for you, start walking 20 minutes, 4 times a week.  Thats it!?  Yes, thats it!  Just walk out the door.  Watch your posture.  Walk tall.   Pretend you are being pulled up by a rope attached to the crown of your head.  Your shoulders should be down, back and relaxed.  Tighten your abdominal muscles and buttock, and fall into a natural stride.  Feel the natural swing of your arms in opposition to your leg swing.  Let the heel of each  foot gently touch the ground and feel the toes of each foot leave the ground last.  Be sure to drink plenty of water before, during, and after walking.   Incorporate a warm up and cool down into your routine.  Start your walk at a slow warm up pace, then walk desired length of time.  End your walk with slower cool down.  Any stretches that your therapist has recommended for you may be done before and after your walk to prevent injury.  The hardest part of starting a fitness program can be developing the habit.  Walking regularly will help (a minimum of 3 days a week is a good goal).  Developing a routine, walking partners, and keeping a journal are ways to help keep your motivation up.  Wearing a pedometer or using an jluis that records your steps, are motivational as well, and shown by research to increase your activity level.    PROGRESSING:  Once you are walking 4 times a week on a regular basis, you can progress your program by adding 5 minutes to each of your walks that week.   Set a time goal to achieve.   Every week add 5 minutes to your walk.   If your goal is 40 minutes, add 5 minutes weekly until you are comfortably walking 40 minutes 4 times a week.  Once you have achieved your time goal, you can further progress your program by increasing the speed at which you walk.  Dont forget to warm up and cool down as you start walking at a brisker pace.

## 2023-08-11 ENCOUNTER — OFFICE VISIT (OUTPATIENT)
Dept: INTERNAL MEDICINE | Facility: CLINIC | Age: 63
End: 2023-08-11
Payer: COMMERCIAL

## 2023-08-11 ENCOUNTER — TELEPHONE (OUTPATIENT)
Dept: INTERNAL MEDICINE | Facility: CLINIC | Age: 63
End: 2023-08-11

## 2023-08-11 VITALS
WEIGHT: 157 LBS | SYSTOLIC BLOOD PRESSURE: 166 MMHG | DIASTOLIC BLOOD PRESSURE: 87 MMHG | OXYGEN SATURATION: 97 % | RESPIRATION RATE: 16 BRPM | HEART RATE: 68 BPM | BODY MASS INDEX: 29.64 KG/M2 | HEIGHT: 61 IN

## 2023-08-11 DIAGNOSIS — Z80.0 FAMILY HISTORY OF COLON CANCER: ICD-10-CM

## 2023-08-11 DIAGNOSIS — Z79.890 HORMONE REPLACEMENT THERAPY (HRT): ICD-10-CM

## 2023-08-11 DIAGNOSIS — R03.0 ELEVATED BP WITHOUT DIAGNOSIS OF HYPERTENSION: ICD-10-CM

## 2023-08-11 DIAGNOSIS — Z79.1 NSAID LONG-TERM USE: ICD-10-CM

## 2023-08-11 PROCEDURE — 99999 PR PBB SHADOW E&M-EST. PATIENT-LVL III: ICD-10-PCS | Mod: PBBFAC,COE,, | Performed by: HOSPITALIST

## 2023-08-11 PROCEDURE — 99215 PR OFFICE/OUTPT VISIT, EST, LEVL V, 40-54 MIN: ICD-10-PCS | Mod: S$GLB,COE,, | Performed by: HOSPITALIST

## 2023-08-11 PROCEDURE — 99215 OFFICE O/P EST HI 40 MIN: CPT | Mod: S$GLB,COE,, | Performed by: HOSPITALIST

## 2023-08-11 PROCEDURE — 99999 PR PBB SHADOW E&M-EST. PATIENT-LVL III: CPT | Mod: PBBFAC,COE,, | Performed by: HOSPITALIST

## 2023-08-11 RX ORDER — IBUPROFEN 200 MG
400 TABLET ORAL
COMMUNITY
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
## 2023-08-11 NOTE — ASSESSMENT & PLAN NOTE
Her blood pressure readings today and yesterday are elevated but her blood pressure readings usually or not this elevated  Her home blood pressure readings are about the high 140s over 60s to 80s    Vitals - 1 value per visit 8/10/2023 8/10/2023 8/10/2023 8/10/2023   SYSTOLIC 185 185 187 213   DIASTOLIC 86 86 93 100     Vitals - 1 value per visit 8/10/2023 8/10/2023 8/10/2023 8/10/2023   SYSTOLIC 198 196 163 158   DIASTOLIC 93 93 93 91     Vitals - 1 value per visit 8/11/2023   SYSTOLIC 166   DIASTOLIC 87     She has baking that could be contributing to her blood pressure she also has salty snacks a could be contributing to her blood pressure    We discussed about medication versus lifestyle and she chose to alter her lifestyle    I suggested watching salt intake and mild exercise as she can handle  I suggested losing about 5% of her weight which is about 7-1/2 lb to be able to get health benefits  She drinks 2 regular Coca-Cola a day    I suggested checking the accuracy of her blood pressure machine with that of her doctors by taking her blood pressure machine to her primary care doctor towards end of the month when she has an appointment with the primary care doctor   I suggested logging her blood pressure readings and send the readings to me    I also discussed that 2 of her medication namely ibuprofen, meloxicam could be contributing to her blood pressure    Repeat blood pressure is still elevated   I suggested to go to the emergency room for unwell at any time

## 2023-08-11 NOTE — TELEPHONE ENCOUNTER
Patient had already checked out of facility, will send lab orders via mail and have them don at an outside facility. Patient verified understanding and will be in touch when the labs are to be drawn.

## 2023-08-11 NOTE — ASSESSMENT & PLAN NOTE
She gets colon cancer screening and to her understanding she is up-to-date with colon cancer screening

## 2023-08-11 NOTE — ASSESSMENT & PLAN NOTE
She has been taking meloxicam once a day for her right foot pain  No foot injury or fracture history   I have suggested care with anti-inflammatory medication use  She is also taking BC Powder and ibuprofen up to 3 to 4 times a week     She had abdominal discomfort in the upper abdominal area when she took both the BC Powder and ibuprofen on the same day  I suggested avoidance    She does not have any ongoing abdominal discomfort or also problems    Comment she has no suggestions of upper GI bleeding, lower GI bleeding, urinary or vaginal bleeding    I have discussed the longstanding effects of anti-inflammatory medication namely ulcer, liver and kidney problem  She is not known to have any of these problems    I suggested holding anti-inflammatory medicine 1 week prior to surgery or as directed by the surgeon
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
## 2023-08-11 NOTE — ASSESSMENT & PLAN NOTE
She is taking Estrace 2 mg once a day ever since she had a total hysterectomy in 2014 at about age 54 for heavy cycles from fibroids    To her understanding her ovaries were also involved    She had menopausal symptoms including hot flashes, mood swing, vaginal dryness for which she was commenced on Estrace    She does not have any menopausal symptoms currently while taking Estrace    On hormone replacement therapy   Risk / benefits ofhormone replacement therpay   use in the perioperative period discussed   Risk of thrombosis discussed with hormone replacement therpay  use  Preferable to hold hormone replacement therpay 1-2 weeks pre op until  Mobility returns to base line   She choose was to hold hormone replacement therapy 2 weeks prior to surgery    I have involved her in this decision making process   I suggested letting her doctor know about this    She gets breast cancer screening with self exam mammogram and clinical breasr exam

## 2023-08-11 NOTE — OUTPATIENT SUBJECTIVE & OBJECTIVE
"Outpatient Subjective & Objective     Chief complaint-Preoperative evaluation, Perioperative Medical management, complication reduction plan     Active cardiac conditions- none    Revised cardiac risk index predictors- none    Functional capacity -Examples of physical activity , physical job, during time off from work she does yd work, Rakes leaves, trims the bushes, helps her  more the lawn,  can take 1 flight of stairs----- She can undertake all the above activities without  chest pain,chest tightness, Shortness of breath ,dizziness,lightheadedness making her exercise tolerance more,  than 4 Mets.       Review of Systems   Constitutional:  Negative for chills and fever.          Weight gain from reduced activity, soft drink   HENT:          STOPBANG score  2/ 8    Snoring - Not loud  Elevated BP  Age over 50        Eyes:         No new visual changes   Respiratory:          No cough , phlegm    No Hemoptysis   Cardiovascular:         As noted   Gastrointestinal:         No overt GI/ blood losses  Bowel movements- Regular    Endocrine:        Prednisone use > 20 mg daily for 3 weeks- none   Genitourinary:  Negative for dysuria.        No urinary hesitancy    Musculoskeletal:         As above      Skin:  Negative for rash.   Neurological:  Negative for syncope.        No unilateral weakness   Hematological:         Current use of Anticoagulants  None   Psychiatric/Behavioral:          No Depression,Anxiety     She was pregnant tries and 3 children that are grown  No vascular stenting  No past medical history pertinent negatives.  No family history on file.      No anesthesia, bleeding, cardiac problems , PONV with previous surgeries/procedures.  Medications and Allergies reviewed in epic.     FH- No anesthesia,bleeding / venous thrombosis ,  in family      Physical Exam  Blood pressure (!) 166/87, pulse 68, resp. rate 16, height 5' 1" (1.549 m), weight 71.2 kg (157 lb), SpO2 97 %.  Manual blood pressure " checked by me while she was seated on the right upper extremity was 180/80 and to her understanding her blood pressure readings are equal on Both sides    I offered a blanket and the presence of a chaperone during physical examination   She was comfortable to proceed with the exam without the the presence of a chaperone    Examined with Medical student       Physical Exam  Constitutional- Vitals - Body mass index is 29.66 kg/m².,   Vitals:    08/11/23 0946   BP: (!) 166/87   Pulse: 68   Resp: 16     General appearance-Conscious,Coherent  Eyes- No conjunctival icterus,pupils  round  and reactive to light   ENT-Oral cavity- moist  and lower partial denture    , Hearing grossly normal   Neck- No thyromegaly ,Trachea -central, No jugular venous distension,   No Carotid Bruit   Cardiovascular -Heart Sounds- Normal  and  no murmur   , No gallop rhythm   Respiratory - Normal Respiratory Effort, Normal breath sounds,  no wheeze , and  no forced expiratory wheeze    Peripheral pitting pedal edema-- none , no calf pain   Gastrointestinal -Soft abdomen, No palpable masses, Non Tender,Liver,Spleen not palpable. No-- free fluid and shifting dullness  Musculoskeletal- No finger Clubbing. Strength grossly normal   Lymphatic-No Palpable cervical, axillary,Inguinal lymphadenopathy   Psychiatric - normal effect,Orientation  Rt Dorsalis pedis pulses-palpable    Lt Dorsalis pedis pulses- palpable   Rt Posterior tibial pulses -palpable   Left posterior tibial pulses -palpable   Miscellaneous -  Surgical scarlower abdomen   and  no renal bruit   Tried to examine for optic discs    Investigations  Lab and Imaging have been reviewed in epic.  We discussed EKG but given that  she is doing good -deferred      Review of old records- Was done and information gathered regards to events leading to surgery and health conditions of significance in the perioperative period.    Outpatient Subjective & Objective

## 2023-08-11 NOTE — ASSESSMENT & PLAN NOTE
She has gained weight from reduced activity from her back problem and she also attributes her weight gain to snacking while at work    Weight related conditions       Not troubled with / Not known to have     HTN  Type 2 Diabetes   Hyperlipidemia   Sleep apnea   Acid reflux   Fatty liver   Osteoarthritis    Encouraged weight loss

## 2023-08-21 ENCOUNTER — TELEPHONE (OUTPATIENT)
Dept: NEUROSURGERY | Facility: CLINIC | Age: 63
End: 2023-08-21
Payer: COMMERCIAL

## 2023-08-21 NOTE — TELEPHONE ENCOUNTER
KAVON: all clinicals sent to [facility] and PCP. Patient had pain injection and saw PMR. Patient does need surgery but wants to wait and think about it. all appointments linked and bundle closed.    Keiry Castañeda, RN, CMSRN  RN Navigator  [facility]

## 2023-10-24 ENCOUNTER — TELEPHONE (OUTPATIENT)
Dept: ORTHOPEDICS | Facility: CLINIC | Age: 63
End: 2023-10-24
Payer: COMMERCIAL

## 2023-10-24 NOTE — TELEPHONE ENCOUNTER
Received email from [facility] that patient was interested in pursuing surgery with Dr. Parks. Called patient and spoke to her but she stated she has to hold off on surgery for now as her  was just recently diagnosised with cancerl. She will call me or [employer] when she is ready for surgery.    Keiry Castañeda, RN, CMSRN  RN Navigator  [facility]

## 2024-09-23 DIAGNOSIS — M51.36 DDD (DEGENERATIVE DISC DISEASE), LUMBAR: Primary | ICD-10-CM

## 2024-09-25 ENCOUNTER — TELEPHONE (OUTPATIENT)
Dept: BARIATRICS | Facility: CLINIC | Age: 64
End: 2024-09-25

## 2024-09-25 NOTE — TELEPHONE ENCOUNTER
Spoke with pt and she advised that she is in the process of making some calls to try to find an affordable place to do her MRI. She advised that once she has found a cheaper place, she will get that done and then [facility].

## 2024-10-15 ENCOUNTER — TELEPHONE (OUTPATIENT)
Dept: NEUROSURGERY | Facility: CLINIC | Age: 64
End: 2024-10-15
Payer: COMMERCIAL

## 2024-10-15 RX ORDER — HYDROCODONE BITARTRATE AND ACETAMINOPHEN 7.5; 325 MG/1; MG/1
1 TABLET ORAL EVERY 6 HOURS PRN
COMMUNITY
Start: 2024-09-19

## 2024-10-15 RX ORDER — AMLODIPINE BESYLATE 5 MG/1
5 TABLET ORAL DAILY
COMMUNITY
Start: 2024-08-21

## 2024-10-15 NOTE — TELEPHONE ENCOUNTER
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 No   Do you have HSA insurance? No                Have support to help with care and appointments: Yes   Travel Caregiver:         What is your chief complaint? back pain down right leg- (worse on right leg)  right down- pain all the way to feet  left leg- pain stops at mid thigh    How long has it been going on? years    Have you had pain like this before?Yes     Have you sought treatment for this condition in the past?Yes   If yes, what treatments did you then?  If yes, when did those stop working?    Where is the pain the worst? back    Does the pain radiate? down leg    Where else do you hurt?     Is the pain constant or does it go away to a 0/10 at times even if the pain comes - 8.5 intermittent    What makes the pain worse? hard to say- standing or walking- sometimes sleeping-sometimes when move a certain way    What makes the pain better or decreases the pain (which position is least uncomfortable)? heating pad sometimes help    Any other symptoms?     Any bowel or bladder incontinence?  no  Or changes?    What treatments have you tried for your current pain, and did they help?   Physical therapy? no   Chiropractor?   Injections? What kinds - no injections since 2023   Prior back surgery?   Medication? BC powder- hydorcodone/tylnol      BMI: 28  Ht:5'0"  Weight: 148 pounds    AIC: not diabetic    Social Hx:    Tobacco Use: Low Risk  (8/11/2023)    Patient History     Smoking Tobacco Use: Never     Smokeless Tobacco Use: Never     Passive Exposure: Not on file                    Allergies:   Review of patient's allergies indicates:  No Known Allergies      Medication list:   Current Outpatient Medications on File Prior to Visit   Medication Sig Dispense Refill    aspirin/salicylamide/caffeine (BC HEADACHE POWDER ORAL) Take by " mouth. She takes BC Powder as needed up to 3 to 4 times a week      estradioL (ESTRACE) 2 MG tablet Take 2 mg by mouth once daily.      ibuprofen (ADVIL,MOTRIN) 200 MG tablet Take 400 mg by mouth as needed for Pain. As needed 3 to 4 times a week      meloxicam (MOBIC) 15 MG tablet Take 15 mg by mouth once daily.       No current facility-administered medications on file prior to visit.         Health Hx:  Past Medical History:   Diagnosis Date    Headache          Surgical Hx:   Past Surgical History:   Procedure Laterality Date     SECTION      , ,     HYSTERECTOMY      INJECTION OF FACET JOINT Bilateral 08/10/2023    Procedure: FACET JOINT INJECTION BILATERAL L5/S1 *[facility]ON PT*;  Surgeon: Rashid Cook MD;  Location: [facility];  Service: Pain Management;  Laterality: Bilateral;    left knee surgery-            Can you take one flight of stairs: yes    Ever been diagnosed with sleep apnea No    CPAPNo    Images Received:  MRI Yes    Type:  Xray No   Type:  EMG  No       Surgery recommended: Yes Dr. Parks had offered surgery but  had cancer treatment.   ready for surgery    PCP Information:   Dr. Alhaji Macedo [phone]      Spoke with pt, reviewed history, discussed timeframe and expectation regarding the [facility], discussed logging in to the [facility] portal and to expect link to complete online seminar, confirmed PCP. Explained I would be sending imaging, chart review and spine screen assessment to spine surgeon, then to Dr. Esposito to see if any additional orders are needed other than CXR and non bundled labs and I would reach back out when this information if obtained.  Explained that initial trip is an evaluation visit and if surgery needed and determined a surgery date would be determined with surgeon and patient. From there, I would send preop orders to PCP for them to complete at home.       Discussed surgery requiremernts of BMi less than 40, AIC  less than 8 and smoking cessation 6 weeks prior to surgery with a required cotine test 2 weeks prior to surgery. Patient verbalized understanding.      Patient verbalized understanding of the above and instructed to reach out with any questions or concerns. Direct phone # given and explained the use of patient portal communication.      Discussed initial evaluation dates- will comein 10/29-11/1. will submit Plan of care to [facility].    Keiry Castañeda, RN, CMSRN  RN Navigator  [facility]   [phone]  Fax [phone]

## 2024-10-16 ENCOUNTER — TELEPHONE (OUTPATIENT)
Dept: ORTHOPEDICS | Facility: CLINIC | Age: 64
End: 2024-10-16
Payer: COMMERCIAL

## 2024-10-16 DIAGNOSIS — M54.9 DORSALGIA: Primary | ICD-10-CM

## 2024-10-30 ENCOUNTER — HOSPITAL ENCOUNTER (OUTPATIENT)
Dept: RADIOLOGY | Facility: HOSPITAL | Age: 64
Discharge: HOME | End: 2024-10-30
Attending: ORTHOPAEDIC SURGERY
Payer: COMMERCIAL

## 2024-10-30 ENCOUNTER — OFFICE VISIT (OUTPATIENT)
Dept: ORTHOPEDICS | Facility: CLINIC | Age: 64
End: 2024-10-30
Payer: COMMERCIAL

## 2024-10-30 ENCOUNTER — TELEPHONE (OUTPATIENT)
Dept: PREADMISSION TESTING | Facility: HOSPITAL | Age: 64
End: 2024-10-30
Payer: COMMERCIAL

## 2024-10-30 VITALS — BODY MASS INDEX: 28.93 KG/M2 | HEIGHT: 61 IN | WEIGHT: 153.25 LBS

## 2024-10-30 DIAGNOSIS — M48.07 SPINAL STENOSIS, LUMBOSACRAL REGION: ICD-10-CM

## 2024-10-30 DIAGNOSIS — M43.10 DEGENERATIVE SPONDYLOLISTHESIS: Primary | ICD-10-CM

## 2024-10-30 DIAGNOSIS — M47.816 LUMBAR SPONDYLOSIS: ICD-10-CM

## 2024-10-30 DIAGNOSIS — M54.16 LUMBAR RADICULOPATHY: ICD-10-CM

## 2024-10-30 DIAGNOSIS — Z98.1 HISTORY OF LUMBAR FUSION: ICD-10-CM

## 2024-10-30 DIAGNOSIS — M54.9 DORSALGIA: ICD-10-CM

## 2024-10-30 PROCEDURE — 99999 PR PBB SHADOW E&M-EST. PATIENT-LVL IV: CPT | Mod: PBBFAC,COE,, | Performed by: ORTHOPAEDIC SURGERY

## 2024-10-30 PROCEDURE — 72114 X-RAY EXAM L-S SPINE BENDING: CPT | Mod: TC

## 2024-10-30 PROCEDURE — 72131 CT LUMBAR SPINE W/O DYE: CPT | Mod: TC

## 2024-10-30 PROCEDURE — 72114 X-RAY EXAM L-S SPINE BENDING: CPT | Mod: 26,COE,, | Performed by: RADIOLOGY

## 2024-10-30 PROCEDURE — 72131 CT LUMBAR SPINE W/O DYE: CPT | Mod: 26,COE,, | Performed by: RADIOLOGY

## 2024-10-31 ENCOUNTER — LAB VISIT (OUTPATIENT)
Dept: LAB | Facility: HOSPITAL | Age: 64
End: 2024-10-31
Payer: COMMERCIAL

## 2024-10-31 ENCOUNTER — HOSPITAL ENCOUNTER (OUTPATIENT)
Dept: CARDIOLOGY | Facility: CLINIC | Age: 64
Discharge: HOME | End: 2024-10-31
Payer: COMMERCIAL

## 2024-10-31 ENCOUNTER — OFFICE VISIT (OUTPATIENT)
Dept: INTERNAL MEDICINE | Facility: CLINIC | Age: 64
End: 2024-10-31
Payer: COMMERCIAL

## 2024-10-31 VITALS
BODY MASS INDEX: 30.29 KG/M2 | HEIGHT: 60 IN | DIASTOLIC BLOOD PRESSURE: 86 MMHG | SYSTOLIC BLOOD PRESSURE: 148 MMHG | WEIGHT: 154.31 LBS | HEART RATE: 74 BPM | TEMPERATURE: 98 F | OXYGEN SATURATION: 100 %

## 2024-10-31 DIAGNOSIS — M25.69 DECREASED RANGE OF MOTION OF TRUNK AND BACK: ICD-10-CM

## 2024-10-31 DIAGNOSIS — R03.0 ELEVATED BP WITHOUT DIAGNOSIS OF HYPERTENSION: ICD-10-CM

## 2024-10-31 DIAGNOSIS — Z79.1 NSAID LONG-TERM USE: ICD-10-CM

## 2024-10-31 DIAGNOSIS — I10 HYPERTENSION, UNSPECIFIED TYPE: ICD-10-CM

## 2024-10-31 DIAGNOSIS — Z79.890 HORMONE REPLACEMENT THERAPY (HRT): ICD-10-CM

## 2024-10-31 DIAGNOSIS — R03.0 ELEVATED BP WITHOUT DIAGNOSIS OF HYPERTENSION: Primary | ICD-10-CM

## 2024-10-31 DIAGNOSIS — Z80.0 FAMILY HISTORY OF COLON CANCER: ICD-10-CM

## 2024-10-31 LAB
ALBUMIN SERPL BCP-MCNC: 3.6 G/DL (ref 3.5–5.2)
ALP SERPL-CCNC: 63 U/L (ref 40–150)
ALT SERPL W/O P-5'-P-CCNC: 12 U/L (ref 10–44)
ANION GAP SERPL CALC-SCNC: 9 MMOL/L (ref 8–16)
APTT PPP: 26.8 SEC (ref 21–32)
AST SERPL-CCNC: 18 U/L (ref 10–40)
BASOPHILS # BLD AUTO: 0.04 K/UL (ref 0–0.2)
BASOPHILS NFR BLD: 0.7 % (ref 0–1.9)
BILIRUB SERPL-MCNC: 0.2 MG/DL (ref 0.1–1)
BUN SERPL-MCNC: 6 MG/DL (ref 8–23)
CALCIUM SERPL-MCNC: 9.1 MG/DL (ref 8.7–10.5)
CHLORIDE SERPL-SCNC: 103 MMOL/L (ref 95–110)
CO2 SERPL-SCNC: 30 MMOL/L (ref 23–29)
CREAT SERPL-MCNC: 0.8 MG/DL (ref 0.5–1.4)
DIFFERENTIAL METHOD BLD: ABNORMAL
EOSINOPHIL # BLD AUTO: 0.1 K/UL (ref 0–0.5)
EOSINOPHIL NFR BLD: 1.6 % (ref 0–8)
ERYTHROCYTE [DISTWIDTH] IN BLOOD BY AUTOMATED COUNT: 13.3 % (ref 11.5–14.5)
EST. GFR  (NO RACE VARIABLE): >60 ML/MIN/1.73 M^2
ESTIMATED AVG GLUCOSE: 105 MG/DL (ref 68–131)
GLUCOSE SERPL-MCNC: 78 MG/DL (ref 70–110)
HBA1C MFR BLD: 5.3 % (ref 4–5.6)
HCT VFR BLD AUTO: 37 % (ref 37–48.5)
HGB BLD-MCNC: 12 G/DL (ref 12–16)
IMM GRANULOCYTES # BLD AUTO: 0.01 K/UL (ref 0–0.04)
IMM GRANULOCYTES NFR BLD AUTO: 0.2 % (ref 0–0.5)
INR PPP: 1 (ref 0.8–1.2)
LYMPHOCYTES # BLD AUTO: 2.1 K/UL (ref 1–4.8)
LYMPHOCYTES NFR BLD: 37.1 % (ref 18–48)
MCH RBC QN AUTO: 32.5 PG (ref 27–31)
MCHC RBC AUTO-ENTMCNC: 32.4 G/DL (ref 32–36)
MCV RBC AUTO: 100 FL (ref 82–98)
MONOCYTES # BLD AUTO: 0.7 K/UL (ref 0.3–1)
MONOCYTES NFR BLD: 11.8 % (ref 4–15)
NEUTROPHILS # BLD AUTO: 2.8 K/UL (ref 1.8–7.7)
NEUTROPHILS NFR BLD: 48.6 % (ref 38–73)
NRBC BLD-RTO: 0 /100 WBC
OHS QRS DURATION: 86 MS
OHS QTC CALCULATION: 425 MS
PLATELET # BLD AUTO: 283 K/UL (ref 150–450)
PMV BLD AUTO: 10.6 FL (ref 9.2–12.9)
POTASSIUM SERPL-SCNC: 4.2 MMOL/L (ref 3.5–5.1)
PROT SERPL-MCNC: 7.6 G/DL (ref 6–8.4)
PROTHROMBIN TIME: 10.7 SEC (ref 9–12.5)
RBC # BLD AUTO: 3.69 M/UL (ref 4–5.4)
SODIUM SERPL-SCNC: 142 MMOL/L (ref 136–145)
TSH SERPL DL<=0.005 MIU/L-ACNC: 2.96 UIU/ML (ref 0.4–4)
WBC # BLD AUTO: 5.69 K/UL (ref 3.9–12.7)

## 2024-10-31 PROCEDURE — 83036 HEMOGLOBIN GLYCOSYLATED A1C: CPT | Performed by: NURSE PRACTITIONER

## 2024-10-31 PROCEDURE — 85730 THROMBOPLASTIN TIME PARTIAL: CPT | Performed by: NURSE PRACTITIONER

## 2024-10-31 PROCEDURE — 85610 PROTHROMBIN TIME: CPT | Performed by: NURSE PRACTITIONER

## 2024-10-31 PROCEDURE — 84443 ASSAY THYROID STIM HORMONE: CPT | Performed by: NURSE PRACTITIONER

## 2024-10-31 PROCEDURE — 85025 COMPLETE CBC W/AUTO DIFF WBC: CPT | Performed by: NURSE PRACTITIONER

## 2024-10-31 PROCEDURE — 93010 ELECTROCARDIOGRAM REPORT: CPT | Mod: S$GLB,COE,, | Performed by: INTERNAL MEDICINE

## 2024-10-31 PROCEDURE — 80053 COMPREHEN METABOLIC PANEL: CPT | Performed by: NURSE PRACTITIONER

## 2024-10-31 PROCEDURE — 99999 PR PBB SHADOW E&M-EST. PATIENT-LVL III: CPT | Mod: PBBFAC,COE,, | Performed by: NURSE PRACTITIONER

## 2024-11-05 ENCOUNTER — TELEPHONE (OUTPATIENT)
Dept: SPINE | Facility: CLINIC | Age: 64
End: 2024-11-05
Payer: COMMERCIAL

## 2024-11-05 NOTE — TELEPHONE ENCOUNTER
Left message with office staff for Dr. Alhaji Macedo ( pcp) asking that they please advise the nurse that we have not yet received the pcp agreement. Fax number left with office staff.

## 2024-12-16 ENCOUNTER — TELEPHONE (OUTPATIENT)
Dept: ORTHOPEDICS | Facility: CLINIC | Age: 64
End: 2024-12-16
Payer: COMMERCIAL

## 2024-12-16 NOTE — TELEPHONE ENCOUNTER
spoke with patient regarding  needing preop labs done. faxed all request to Dr. Macedo. Patient verbalizes understanding and will followup with her PCP.    Keiry Castañeda, RN, CMSRN  RN Navigator  [facility]   [phone]  Fax [phone]

## 2024-12-27 NOTE — PROGRESS NOTES
Date: 12/27/2024    Supervising Physician: Shon Sheikh M.D.    Date of Surgery: 1/9/25    Procedure: L3-4 TLIF and L5 revision     History: Rocio Schroeder is seen today for follow-up following the above listed procedure. Overall the patient is doing well but today notes moderate back pain. She states that even taking 10mg of oxycodone has not helped with her pain.   Pain is well controlled with current pain medication.  she denies fever, chills, and sweats since the time of the surgery.     Exam: Post op dressing taken down.  1inch abrasion to the left of the incision without drainage; likely reaction from adhesive. Incision is healing well, clean, dry and intact.   There is no sign of infection. Neuro exam is stable. No signs of DVT.    Radiographs: hardware in place without failure    Assessment/Plan: 2 weeks post op.    Doing well postoperatively.  reviewed. No bending twisting or lifting over 10lbs for 11 weeks. Oxy discontinued. Patient switched to Norco 10mg Q6hrs.     Follow up with primary care.     Thank you for the opportunity to participate in this patient's care. Please give me a call if there are any concerns or questions.    POSTOPERATIVE INSTRUCTIONS -   LUMBAR FUSION     Antibiotics: You do not need additional antibiotics at home.    NSAIDs: Please refrain from taking ibuprofen (Advil), naproxen (Aleve), and other non steroidal anti-inflammatory medications other than the Celebrex that will be prescribed to you after surgery.    Wound Care: You may remove your dressing and shower 7 days after surgery. Until then please keep your wound clean and dry. Sponge baths are acceptable. Do not go in a pool or hot tub until seen in clinic. Please leave the small steri-strips covering your wound in place until they fall off naturally (2 weeks). You may notice clear suture ends hanging from the sides of your incision after the steri-strips are removed, it is ok to clip these with scissors.    Brace: You  may be prescribed a brace, please wear this when up and walking, it is not necessary to wear at night when sleeping.    Pain: We will use a multimodal approach for pain management after your surgery.  You will be given a prescription for pain medicine when you are discharged from the hospital.  You will also be given prescriptions for Robaxin (a muscle relaxer), Gabapentin, Celebrex and Tylenol.  Please note: you will only be given ONE prescription for narcotics when you are discharged from the hospital.  This medication is for breakthrough pain only. This medication will not be refilled.  The other medications given to you may be refilled if needed.      Infection: Signs of infection include increasing wound drainage and redness around the wound, as well as a temperature over 101.5 degrees. It is unnecessary to take your temperature on a routine basis. Please call the below number if you are concerned about an infection.    Driving and Work: It is ok to return to driving and work as long as you are not taking narcotic pain medications and can walk greater than 100 feet. Please do not lift over 10 pounds or participate in exercise or sports until cleared by Dr. Parks.    Deep Venous Thrombosis (Blood Clots): Symptoms include swelling in the legs and shortness of breath. Please call the office or proceed to the nearest emergency room if you have any of these symptoms.    Physical Therapy: The best physical therapy immediately after surgery is walking. Please try to walk as much as possible.    Follow-up: You will be scheduled for a follow-up appointment in 4 weeks with either Dr. Parks or his physician assistant, Corinne Virk PA-C.    Questions: During business hours please call [phone] for routine questions. For after hours questions please call [phone] and ask to speak with the Orthopaedic resident on call.    Disability: If you submitted short term disability paperwork for us to complete and  would like to check the status, please call the Disability Department at [phone].  You may fax any necessary paperwork to [phone].

## 2024-12-30 ENCOUNTER — TELEPHONE (OUTPATIENT)
Dept: NEUROSURGERY | Facility: CLINIC | Age: 64
End: 2024-12-30
Payer: COMMERCIAL

## 2024-12-30 NOTE — TELEPHONE ENCOUNTER
spoke to patient, she hasn't done upldated preop labs. faxed the preop lab request to her PCP and she is also calling.    Keiry Castañeda, RN, CMSRN  RN Navigator  [facility]  Ph [phone]  Fax [phone]

## 2025-01-06 ENCOUNTER — TELEPHONE (OUTPATIENT)
Dept: NEUROSURGERY | Facility: CLINIC | Age: 65
End: 2025-01-06
Payer: COMMERCIAL

## 2025-01-08 ENCOUNTER — ANESTHESIA EVENT (OUTPATIENT)
Dept: SURGERY | Facility: HOSPITAL | Age: 65
End: 2025-01-08
Payer: COMMERCIAL

## 2025-01-08 ENCOUNTER — OFFICE VISIT (OUTPATIENT)
Dept: ORTHOPEDICS | Facility: CLINIC | Age: 65
End: 2025-01-08
Payer: COMMERCIAL

## 2025-01-08 DIAGNOSIS — Z98.1 HISTORY OF LUMBAR FUSION: ICD-10-CM

## 2025-01-08 DIAGNOSIS — M47.816 LUMBAR SPONDYLOSIS: ICD-10-CM

## 2025-01-08 DIAGNOSIS — M43.10 DEGENERATIVE SPONDYLOLISTHESIS: Primary | ICD-10-CM

## 2025-01-08 DIAGNOSIS — M54.16 LUMBAR RADICULOPATHY: ICD-10-CM

## 2025-01-08 PROCEDURE — 99214 OFFICE O/P EST MOD 30 MIN: CPT | Mod: 57,S$GLB,COE, | Performed by: ORTHOPAEDIC SURGERY

## 2025-01-08 PROCEDURE — 99999 PR PBB SHADOW E&M-EST. PATIENT-LVL III: CPT | Mod: PBBFAC,COE,, | Performed by: ORTHOPAEDIC SURGERY

## 2025-01-08 NOTE — H&P (VIEW-ONLY)
DATE: 1/8/2025  PATIENT: Rocio Schroeder    Attending Physician: FRANK Salgado PATIENT     HISTORY:  Rocio Schroeder is a 64 y.o. female  w/ a hx of L4-5 PSF/TLIF in 2007 who returns to me today for FU. Patient continues to have LBP that radiates posterolaterally down LLE to the knee. Patient has been taking meds without any relief. Patient is miserable and wants surgical intervention.    The patient does not smoke, have DM or endorse IVDU. The patient is not on any blood thinners and does not take chronic narcotics. 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    PMH/PSH/FamHx/SocHx:  Unchanged from prior visit    ROS:  Positive for LBP and LLE pain  Denies perineal paresthesias, bowel or bladder incontinence    EXAM:  There were no vitals taken for this visit.    My physical examination was notable for the following findings: Normal strength and tone in all major motor groups in the bilateral lower extremities except for 4/5 in L HF. Normal sensation to light touch in the L2-S1 dermatomes bilaterally.    IMAGING:  Today I independently reviewed the following images and my interpretations are as follows:    Previous L-spine XRs showed lumbar spondylosis and DDD. L4-5 fusion. L3-4 anterolisthesis measuring 6mm.    MRI lumbar showed L3-4 mod-severe adjacent level central stenosis.      CT lumbar showed L4-5 fusion.     ASSESSMENT/PLAN:  Patient has lumbar adjacent level degenerative spondylolisthesis and stenosis with LLE radiculopathy, in the setting of prior L4-5 PSF. I discussed the natural history of their diagnoses as well as surgical and nonsurgical treatment options. I educated the patient on the importance of core/back strengthening, correct posture, bending/lifting ergonomics, and low-impact aerobic exercises (walking, elliptical, and aquatherapy). Continue medications. Patient has failed conservative management and is a candidate for L3-5 extension fusion and L3-4 TLIF (L). She will need preop  Notified by nursing patient HR up, he tried to urinate bedside and became more SOB and tachycardic  He had a neb prior to that  HR up to 150s  He had 2 doses of IV metoprolol  Sats were maintained with oxygen  He was tachypnic  Was changed to step down level and will trial on vapotherm  clearance.    I had a sit down discussion with the patient and family regarding the above surgery. We specifically discussed the risks, benefits, and alternatives to surgery. We discussed the surgical procedure including the skin incision, nerve decompression, bone fusion, allograft, iliac crest bone graft, and surgical implants including pedicle screws and interbody devices as indicated: they understand the risks include but are not limited to death, paralysis, blindness, bleeding, infection, damage to arteries, veins and nerves, spinal fluid leak, continued or worsening pain, no improvement in symptoms, non-union, and the possible need for more surgery in the future, the possible need for perioperative blood transfusion, as well as the possibility other unforseen and unknown complications. We talked about expected hospital stay and recovery period. All questions were answered; they understand and wish to proceed.     Shon Sheihk MD  Orthopaedic Spine Surgeon  Department of Orthopaedic Surgery  [phone]

## 2025-01-08 NOTE — ANESTHESIA PREPROCEDURE EVALUATION
[facility]  Anesthesia Pre-Operative Evaluation   2025        Rocio Schroeder, 1960  [id number]  Procedure(s) (LRB):  FUSION, SPINE, LUMBAR, TLIF, POSTERIOR APPROACH, USING PEDICLE SCREW L3-4 (L), REVISION L3-5 GLOBUS ROBOT SNS: SSEP/EMG (N/A)    Subjective    Rocio Schroeder is a 64 y.o. female w/ a significant PMHx of HTN, and lumbar DDD (s/p L4-5 PSF/TLIF in ) now with worsening lumbar stenosis).    Patient now presents for above procedure(s).     ECHO:  No results found for this or any previous visit.      Prev Airway: None documented.    LDA: None documented.       Drips: None documented.      Patient Active Problem List   Diagnosis    Decreased range of motion of trunk and back    Hormone replacement therapy (HRT)    NSAID long-term use    BMI 30.0-30.9,adult    HTN (hypertension)    Family history of colon cancer    Degenerative spondylolisthesis       Review of patient's allergies indicates:  No Known Allergies    Current Inpatient Medications:       No current facility-administered medications on file prior to encounter.     Current Outpatient Medications on File Prior to Encounter   Medication Sig Dispense Refill    amLODIPine (NORVASC) 5 MG tablet Take 5 mg by mouth once daily.      aspirin/salicylamide/caffeine (BC HEADACHE POWDER ORAL) Take by mouth. She takes BC Powder as needed up to 3 to 4 times a week      estradioL (ESTRACE) 2 MG tablet Take 2 mg by mouth once daily.      HYDROcodone-acetaminophen (NORCO) 7.5-325 mg per tablet Take 1 tablet by mouth every 6 (six) hours as needed.         Past Surgical History:   Procedure Laterality Date     SECTION      , ,     HYSTERECTOMY      INJECTION OF FACET JOINT Bilateral 08/10/2023    Procedure: FACET JOINT INJECTION BILATERAL L5/S1 *KAVON PT*;  Surgeon: Rashid Cook MD;  Location: [facility];  Service: Pain Management;  Laterality: Bilateral;    left knee surgery-           Social History:  Tobacco Use: Low Risk  (10/31/2024)    Patient History     Smoking Tobacco Use: Never     Smokeless Tobacco Use: Never     Passive Exposure: Not on file       Alcohol Use: Not At Risk (10/24/2024)    AUDIT-C     Frequency of Alcohol Consumption: Monthly or less     Average Number of Drinks: 1 or 2     Frequency of Binge Drinking: Never       Objective    Vital Signs Range:  BMI Readings from Last 1 Encounters:   10/31/24 30.14 kg/m²               Significant Labs:        Component Value Date/Time    WBC 5.69 10/31/2024 1145    HGB 12.0 10/31/2024 1145    HCT 37.0 10/31/2024 1145     10/31/2024 1145     10/31/2024 1145    K 4.2 10/31/2024 1145     10/31/2024 1145    CO2 30 (H) 10/31/2024 1145    GLU 78 10/31/2024 1145    BUN 6 (L) 10/31/2024 1145    CREATININE 0.8 10/31/2024 1145    CALCIUM 9.1 10/31/2024 1145    ALBUMIN 3.6 10/31/2024 1145    PROT 7.6 10/31/2024 1145    ALKPHOS 63 10/31/2024 1145    BILITOT 0.2 10/31/2024 1145    AST 18 10/31/2024 1145    ALT 12 10/31/2024 1145    INR 1.0 10/31/2024 1145    HGBA1C 5.3 10/31/2024 1145        Please see Results Review for additional labs.     Diagnostic Studies: All relevant studies, reviewed.      EKG:   Results for orders placed or performed during the hospital encounter of 10/31/24   EKG 12-lead    Collection Time: 10/31/24 12:37 PM   Result Value Ref Range    QRS Duration 86 ms    [facility] QTC Calculation 425 ms    Narrative    Test Reason : R03.0,I10,    Vent. Rate : 063 BPM     Atrial Rate : 063 BPM     P-R Int : 142 ms          QRS Dur : 086 ms      QT Int : 416 ms       P-R-T Axes : 055 059 028 degrees     QTc Int : 425 ms    Normal sinus rhythm  Normal ECG  No previous ECGs available  Confirmed by Amaury Martinez MD ([id number]) on 10/31/2024 3:09:42 PM    Referred By: MAYELA MEDINA           Confirmed By:Amaury Martinez MD                                                                                                                   01/08/2025  Rocio Schroeder is a 64 y.o., female.      Pre-op Assessment    I have reviewed the Patient Summary Reports.     I have reviewed the Nursing Notes. I have reviewed the NPO Status.   I have reviewed the Medications.     Review of Systems  Anesthesia Hx:  No problems with previous Anesthesia   History of prior surgery of interest to airway management or planning:          Denies Family Hx of Anesthesia complications.    Denies Personal Hx of Anesthesia complications.                    Cardiovascular:  Exercise tolerance: good   Hypertension, well controlled    Denies CAD.    Denies CABG/stent.     Denies CHF.    no hyperlipidemia                               Pulmonary:    Denies COPD.  Denies Asthma.     Denies Sleep Apnea.                Renal/:   Denies Chronic Renal Disease.                Hepatic/GI:      Denies GERD.                Musculoskeletal:  Arthritis          Spine Disorders: lumbar Degenerative disease and Disc disease           Neurological:    Denies CVA.    Denies Headaches. Denies Seizures.                                Endocrine:  Denies Diabetes.         Denies Obesity / BMI > 30  Psych:  Denies Psychiatric History.                Physical Exam  General: Well nourished and Cooperative    Airway:  Mallampati: I   Mouth Opening: Normal  TM Distance: Normal  Tongue: Normal  Neck ROM: Normal ROM    Dental:  Partial Dentures    Chest/Lungs:  Clear to auscultation, Normal Respiratory Rate    Heart:  Rate: Normal    Anesthesia Plan  Type of Anesthesia, risks & benefits discussed:    Anesthesia Type: Gen ETT  Intra-op Monitoring Plan: Standard ASA Monitors  Post Op Pain Control Plan: multimodal analgesia and IV/PO Opioids PRN  Induction:  IV  Airway Plan: Direct and Video, Post-Induction  Informed Consent: Informed consent signed with the Patient and all parties understand the risks and agree with anesthesia plan.  All questions answered. Patient consented to blood products?  Yes  ASA Score: 2  Day of Surgery Review of History & Physical: H&P Update referred to the surgeon/provider.    Ready For Surgery From Anesthesia Perspective.     .

## 2025-01-08 NOTE — PROGRESS NOTES
DATE: 1/8/2025  PATIENT: Rocio Schroeder    Attending Physician: FRANK Salgado PATIENT     HISTORY:  Rocio Schroeder is a 64 y.o. female  w/ a hx of L4-5 PSF/TLIF in 2007 who returns to me today for FU. Patient continues to have LBP that radiates posterolaterally down LLE to the knee. Patient has been taking meds without any relief. Patient is miserable and wants surgical intervention.    The patient does not smoke, have DM or endorse IVDU. The patient is not on any blood thinners and does not take chronic narcotics. 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    PMH/PSH/FamHx/SocHx:  Unchanged from prior visit    ROS:  Positive for LBP and LLE pain  Denies perineal paresthesias, bowel or bladder incontinence    EXAM:  There were no vitals taken for this visit.    My physical examination was notable for the following findings: Normal strength and tone in all major motor groups in the bilateral lower extremities except for 4/5 in L HF. Normal sensation to light touch in the L2-S1 dermatomes bilaterally.    IMAGING:  Today I independently reviewed the following images and my interpretations are as follows:    Previous L-spine XRs showed lumbar spondylosis and DDD. L4-5 fusion. L3-4 anterolisthesis measuring 6mm.    MRI lumbar showed L3-4 mod-severe adjacent level central stenosis.      CT lumbar showed L4-5 fusion.     ASSESSMENT/PLAN:  Patient has lumbar adjacent level degenerative spondylolisthesis and stenosis with LLE radiculopathy, in the setting of prior L4-5 PSF. I discussed the natural history of their diagnoses as well as surgical and nonsurgical treatment options. I educated the patient on the importance of core/back strengthening, correct posture, bending/lifting ergonomics, and low-impact aerobic exercises (walking, elliptical, and aquatherapy). Continue medications. Patient has failed conservative management and is a candidate for L3-5 extension fusion and L3-4 TLIF (L). She will need preop  clearance.    I had a sit down discussion with the patient and family regarding the above surgery. We specifically discussed the risks, benefits, and alternatives to surgery. We discussed the surgical procedure including the skin incision, nerve decompression, bone fusion, allograft, iliac crest bone graft, and surgical implants including pedicle screws and interbody devices as indicated: they understand the risks include but are not limited to death, paralysis, blindness, bleeding, infection, damage to arteries, veins and nerves, spinal fluid leak, continued or worsening pain, no improvement in symptoms, non-union, and the possible need for more surgery in the future, the possible need for perioperative blood transfusion, as well as the possibility other unforseen and unknown complications. We talked about expected hospital stay and recovery period. All questions were answered; they understand and wish to proceed.     hSon Sheikh MD  Orthopaedic Spine Surgeon  Department of Orthopaedic Surgery  [phone]

## 2025-01-09 ENCOUNTER — ANESTHESIA (OUTPATIENT)
Dept: SURGERY | Facility: HOSPITAL | Age: 65
End: 2025-01-09
Payer: COMMERCIAL

## 2025-01-09 ENCOUNTER — HOSPITAL ENCOUNTER (INPATIENT)
Facility: HOSPITAL | Age: 65
LOS: 2 days | Discharge: HOME | DRG: 448 | End: 2025-01-11
Attending: ORTHOPAEDIC SURGERY | Admitting: ORTHOPAEDIC SURGERY
Payer: COMMERCIAL

## 2025-01-09 DIAGNOSIS — M43.10 DEGENERATIVE SPONDYLOLISTHESIS: Primary | ICD-10-CM

## 2025-01-09 LAB
ABO + RH BLD: NORMAL
BLD GP AB SCN CELLS X3 SERPL QL: NORMAL

## 2025-01-09 PROCEDURE — 27800903 OPTIME MED/SURG SUP & DEVICES OTHER IMPLANTS: Performed by: ORTHOPAEDIC SURGERY

## 2025-01-09 PROCEDURE — 71000016 HC POSTOP RECOV ADDL HR: Performed by: ORTHOPAEDIC SURGERY

## 2025-01-09 PROCEDURE — 25000003 PHARM REV CODE 250: Performed by: ORTHOPAEDIC SURGERY

## 2025-01-09 PROCEDURE — 20930 SP BONE ALGRFT MORSEL ADD-ON: CPT | Mod: COE,,, | Performed by: ORTHOPAEDIC SURGERY

## 2025-01-09 PROCEDURE — 36000710: Performed by: ORTHOPAEDIC SURGERY

## 2025-01-09 PROCEDURE — 0SG1071 FUSION OF 2 OR MORE LUMBAR VERTEBRAL JOINTS WITH AUTOLOGOUS TISSUE SUBSTITUTE, POSTERIOR APPROACH, POSTERIOR COLUMN, OPEN APPROACH: ICD-10-PCS | Performed by: ORTHOPAEDIC SURGERY

## 2025-01-09 PROCEDURE — 25000003 PHARM REV CODE 250

## 2025-01-09 PROCEDURE — 0KXG0ZZ TRANSFER LEFT TRUNK MUSCLE, OPEN APPROACH: ICD-10-PCS | Performed by: ORTHOPAEDIC SURGERY

## 2025-01-09 PROCEDURE — 20936 SP BONE AGRFT LOCAL ADD-ON: CPT | Mod: COE,,, | Performed by: ORTHOPAEDIC SURGERY

## 2025-01-09 PROCEDURE — 63047 LAM FACETEC & FORAMOT LUMBAR: CPT | Mod: 51,COE,, | Performed by: ORTHOPAEDIC SURGERY

## 2025-01-09 PROCEDURE — 36415 COLL VENOUS BLD VENIPUNCTURE: CPT | Performed by: ORTHOPAEDIC SURGERY

## 2025-01-09 PROCEDURE — 27201037 HC PRESSURE MONITORING SET UP

## 2025-01-09 PROCEDURE — 61783 SCAN PROC SPINAL: CPT | Mod: 59,COE,, | Performed by: ORTHOPAEDIC SURGERY

## 2025-01-09 PROCEDURE — 63600175 PHARM REV CODE 636 W HCPCS: Performed by: ORTHOPAEDIC SURGERY

## 2025-01-09 PROCEDURE — C1713 ANCHOR/SCREW BN/BN,TIS/BN: HCPCS | Performed by: ORTHOPAEDIC SURGERY

## 2025-01-09 PROCEDURE — 22612 ARTHRD PST TQ 1NTRSPC LUMBAR: CPT | Mod: 22,COE,, | Performed by: ORTHOPAEDIC SURGERY

## 2025-01-09 PROCEDURE — 36000711: Performed by: ORTHOPAEDIC SURGERY

## 2025-01-09 PROCEDURE — 27201423 OPTIME MED/SURG SUP & DEVICES STERILE SUPPLY: Performed by: ORTHOPAEDIC SURGERY

## 2025-01-09 PROCEDURE — 01NB0ZZ RELEASE LUMBAR NERVE, OPEN APPROACH: ICD-10-PCS | Performed by: ORTHOPAEDIC SURGERY

## 2025-01-09 PROCEDURE — 22614 ARTHRD PST TQ 1NTRSPC EA ADD: CPT | Mod: COE,,, | Performed by: ORTHOPAEDIC SURGERY

## 2025-01-09 PROCEDURE — 86901 BLOOD TYPING SEROLOGIC RH(D): CPT

## 2025-01-09 PROCEDURE — 15734 MUSCLE-SKIN GRAFT TRUNK: CPT | Mod: 51,COE,, | Performed by: ORTHOPAEDIC SURGERY

## 2025-01-09 PROCEDURE — 63600175 PHARM REV CODE 636 W HCPCS

## 2025-01-09 PROCEDURE — 63600175 PHARM REV CODE 636 W HCPCS: Mod: TB | Performed by: ANESTHESIOLOGY

## 2025-01-09 PROCEDURE — 25000003 PHARM REV CODE 250: Performed by: STUDENT IN AN ORGANIZED HEALTH CARE EDUCATION/TRAINING PROGRAM

## 2025-01-09 PROCEDURE — 63600175 PHARM REV CODE 636 W HCPCS: Performed by: STUDENT IN AN ORGANIZED HEALTH CARE EDUCATION/TRAINING PROGRAM

## 2025-01-09 PROCEDURE — 71000033 HC RECOVERY, INTIAL HOUR: Performed by: ORTHOPAEDIC SURGERY

## 2025-01-09 PROCEDURE — 22840 INSERT SPINE FIXATION DEVICE: CPT | Mod: COE,,, | Performed by: ORTHOPAEDIC SURGERY

## 2025-01-09 PROCEDURE — 11000001 HC ACUTE MED/SURG PRIVATE ROOM

## 2025-01-09 PROCEDURE — 0KXF0ZZ TRANSFER RIGHT TRUNK MUSCLE, OPEN APPROACH: ICD-10-PCS | Performed by: ORTHOPAEDIC SURGERY

## 2025-01-09 PROCEDURE — 37000009 HC ANESTHESIA EA ADD 15 MINS: Performed by: ORTHOPAEDIC SURGERY

## 2025-01-09 PROCEDURE — 37000008 HC ANESTHESIA 1ST 15 MINUTES: Performed by: ORTHOPAEDIC SURGERY

## 2025-01-09 PROCEDURE — 63600175 PHARM REV CODE 636 W HCPCS: Performed by: NURSE ANESTHETIST, CERTIFIED REGISTERED

## 2025-01-09 PROCEDURE — 8E0W4CZ ROBOTIC ASSISTED PROCEDURE OF TRUNK REGION, PERCUTANEOUS ENDOSCOPIC APPROACH: ICD-10-PCS | Performed by: ORTHOPAEDIC SURGERY

## 2025-01-09 PROCEDURE — 71000015 HC POSTOP RECOV 1ST HR: Performed by: ORTHOPAEDIC SURGERY

## 2025-01-09 PROCEDURE — 00NY0ZZ RELEASE LUMBAR SPINAL CORD, OPEN APPROACH: ICD-10-PCS | Performed by: ORTHOPAEDIC SURGERY

## 2025-01-09 DEVICE — KIT BONE GRFT BMP XS: Type: IMPLANTABLE DEVICE | Site: BACK | Status: FUNCTIONAL

## 2025-01-09 DEVICE — SCREW BONE SPINAL 5.5X45MM: Type: IMPLANTABLE DEVICE | Site: BACK | Status: FUNCTIONAL

## 2025-01-09 DEVICE — IMPLANTABLE DEVICE: Type: IMPLANTABLE DEVICE | Site: BACK | Status: FUNCTIONAL

## 2025-01-09 DEVICE — CAP SPINAL LOCK THRD CREO 5.5: Type: IMPLANTABLE DEVICE | Site: BACK | Status: FUNCTIONAL

## 2025-01-09 RX ORDER — CELECOXIB 100 MG/1
100 CAPSULE ORAL 2 TIMES DAILY
Qty: 60 CAPSULE | Refills: 0 | Status: SHIPPED | OUTPATIENT
Start: 2025-01-09 | End: 2025-02-10

## 2025-01-09 RX ORDER — MUPIROCIN 20 MG/G
OINTMENT TOPICAL 2 TIMES DAILY
Status: DISCONTINUED | OUTPATIENT
Start: 2025-01-09 | End: 2025-01-11 | Stop reason: HOSPADM

## 2025-01-09 RX ORDER — OXYCODONE HYDROCHLORIDE 5 MG/1
5 TABLET ORAL EVERY 4 HOURS PRN
Status: DISCONTINUED | OUTPATIENT
Start: 2025-01-09 | End: 2025-01-11 | Stop reason: HOSPADM

## 2025-01-09 RX ORDER — GLUCAGON 1 MG
1 KIT INJECTION
Status: DISCONTINUED | OUTPATIENT
Start: 2025-01-09 | End: 2025-01-09 | Stop reason: HOSPADM

## 2025-01-09 RX ORDER — KETAMINE HCL IN 0.9 % NACL 50 MG/5 ML
SYRINGE (ML) INTRAVENOUS
Status: DISCONTINUED | OUTPATIENT
Start: 2025-01-09 | End: 2025-01-09

## 2025-01-09 RX ORDER — VANCOMYCIN HYDROCHLORIDE 1 G/20ML
INJECTION, POWDER, LYOPHILIZED, FOR SOLUTION INTRAVENOUS
Status: DISCONTINUED | OUTPATIENT
Start: 2025-01-09 | End: 2025-01-09 | Stop reason: HOSPADM

## 2025-01-09 RX ORDER — MUPIROCIN 20 MG/G
OINTMENT TOPICAL
Status: DISCONTINUED | OUTPATIENT
Start: 2025-01-09 | End: 2025-01-09 | Stop reason: HOSPADM

## 2025-01-09 RX ORDER — ONDANSETRON 4 MG/1
4 TABLET, ORALLY DISINTEGRATING ORAL EVERY 8 HOURS PRN
Qty: 10 TABLET | Refills: 0 | Status: SHIPPED | OUTPATIENT
Start: 2025-01-09

## 2025-01-09 RX ORDER — HYDROMORPHONE HYDROCHLORIDE 1 MG/ML
0.2 INJECTION, SOLUTION INTRAMUSCULAR; INTRAVENOUS; SUBCUTANEOUS EVERY 5 MIN PRN
Status: DISCONTINUED | OUTPATIENT
Start: 2025-01-09 | End: 2025-01-09 | Stop reason: HOSPADM

## 2025-01-09 RX ORDER — BISACODYL 10 MG/1
10 SUPPOSITORY RECTAL DAILY PRN
Status: DISCONTINUED | OUTPATIENT
Start: 2025-01-09 | End: 2025-01-11 | Stop reason: HOSPADM

## 2025-01-09 RX ORDER — MAGNESIUM SULFATE HEPTAHYDRATE 500 MG/ML
INJECTION, SOLUTION INTRAMUSCULAR; INTRAVENOUS
Status: DISCONTINUED | OUTPATIENT
Start: 2025-01-09 | End: 2025-01-09

## 2025-01-09 RX ORDER — CEFAZOLIN 2 G/1
2 INJECTION, POWDER, FOR SOLUTION INTRAMUSCULAR; INTRAVENOUS
Status: COMPLETED | OUTPATIENT
Start: 2025-01-09 | End: 2025-01-09

## 2025-01-09 RX ORDER — FAMOTIDINE 20 MG/1
20 TABLET, FILM COATED ORAL 2 TIMES DAILY
Status: DISCONTINUED | OUTPATIENT
Start: 2025-01-09 | End: 2025-01-11 | Stop reason: HOSPADM

## 2025-01-09 RX ORDER — LIDOCAINE HYDROCHLORIDE AND EPINEPHRINE 10; 10 UG/ML; MG/ML
INJECTION, SOLUTION INFILTRATION; PERINEURAL
Status: DISCONTINUED | OUTPATIENT
Start: 2025-01-09 | End: 2025-01-09 | Stop reason: HOSPADM

## 2025-01-09 RX ORDER — ONDANSETRON HYDROCHLORIDE 2 MG/ML
INJECTION, SOLUTION INTRAVENOUS
Status: DISCONTINUED | OUTPATIENT
Start: 2025-01-09 | End: 2025-01-09

## 2025-01-09 RX ORDER — VASOPRESSIN 20 [USP'U]/ML
INJECTION, SOLUTION INTRAMUSCULAR; SUBCUTANEOUS
Status: DISCONTINUED | OUTPATIENT
Start: 2025-01-09 | End: 2025-01-09

## 2025-01-09 RX ORDER — GABAPENTIN 300 MG/1
300 CAPSULE ORAL 3 TIMES DAILY
Qty: 90 CAPSULE | Refills: 0 | Status: SHIPPED | OUTPATIENT
Start: 2025-01-09 | End: 2025-02-10

## 2025-01-09 RX ORDER — HALOPERIDOL 5 MG/ML
0.5 INJECTION INTRAMUSCULAR EVERY 10 MIN PRN
Status: DISCONTINUED | OUTPATIENT
Start: 2025-01-09 | End: 2025-01-09 | Stop reason: HOSPADM

## 2025-01-09 RX ORDER — OXYCODONE HYDROCHLORIDE 5 MG/1
5 TABLET ORAL EVERY 4 HOURS PRN
Qty: 25 TABLET | Refills: 0 | Status: SHIPPED | OUTPATIENT
Start: 2025-01-09 | End: 2025-01-16

## 2025-01-09 RX ORDER — PHENYLEPHRINE HYDROCHLORIDE 10 MG/ML
INJECTION INTRAVENOUS
Status: DISCONTINUED | OUTPATIENT
Start: 2025-01-09 | End: 2025-01-09

## 2025-01-09 RX ORDER — LIDOCAINE HYDROCHLORIDE 10 MG/ML
1 INJECTION, SOLUTION EPIDURAL; INFILTRATION; INTRACAUDAL; PERINEURAL ONCE AS NEEDED
Status: COMPLETED | OUTPATIENT
Start: 2025-01-09 | End: 2025-01-09

## 2025-01-09 RX ORDER — SODIUM CHLORIDE 0.9 % (FLUSH) 0.9 %
10 SYRINGE (ML) INJECTION
Status: DISCONTINUED | OUTPATIENT
Start: 2025-01-09 | End: 2025-01-09 | Stop reason: HOSPADM

## 2025-01-09 RX ORDER — POLYETHYLENE GLYCOL 3350 17 G/17G
17 POWDER, FOR SOLUTION ORAL DAILY
Status: DISCONTINUED | OUTPATIENT
Start: 2025-01-09 | End: 2025-01-11 | Stop reason: HOSPADM

## 2025-01-09 RX ORDER — FENTANYL CITRATE 50 UG/ML
INJECTION, SOLUTION INTRAMUSCULAR; INTRAVENOUS
Status: DISCONTINUED | OUTPATIENT
Start: 2025-01-09 | End: 2025-01-09

## 2025-01-09 RX ORDER — EPHEDRINE SULFATE 50 MG/ML
INJECTION, SOLUTION INTRAVENOUS
Status: DISCONTINUED | OUTPATIENT
Start: 2025-01-09 | End: 2025-01-09

## 2025-01-09 RX ORDER — MEPERIDINE HYDROCHLORIDE 50 MG/ML
12.5 INJECTION INTRAMUSCULAR; INTRAVENOUS; SUBCUTANEOUS EVERY 10 MIN PRN
Status: DISCONTINUED | OUTPATIENT
Start: 2025-01-09 | End: 2025-01-09 | Stop reason: HOSPADM

## 2025-01-09 RX ORDER — MIDAZOLAM HYDROCHLORIDE 1 MG/ML
INJECTION INTRAMUSCULAR; INTRAVENOUS
Status: DISCONTINUED | OUTPATIENT
Start: 2025-01-09 | End: 2025-01-09

## 2025-01-09 RX ORDER — ROCURONIUM BROMIDE 10 MG/ML
INJECTION, SOLUTION INTRAVENOUS
Status: DISCONTINUED | OUTPATIENT
Start: 2025-01-09 | End: 2025-01-09

## 2025-01-09 RX ORDER — FAMOTIDINE 20 MG/1
20 TABLET, FILM COATED ORAL 2 TIMES DAILY
Qty: 60 TABLET | Refills: 0 | Status: SHIPPED | OUTPATIENT
Start: 2025-01-09 | End: 2025-02-10

## 2025-01-09 RX ORDER — METHOCARBAMOL 500 MG/1
500 TABLET, FILM COATED ORAL 4 TIMES DAILY
Qty: 120 TABLET | Refills: 0 | Status: SHIPPED | OUTPATIENT
Start: 2025-01-09 | End: 2025-02-10

## 2025-01-09 RX ORDER — DIAZEPAM 10 MG/2ML
2.5 INJECTION INTRAMUSCULAR
Status: DISCONTINUED | OUTPATIENT
Start: 2025-01-09 | End: 2025-01-09 | Stop reason: HOSPADM

## 2025-01-09 RX ORDER — ACETAMINOPHEN 500 MG
1000 TABLET ORAL EVERY 8 HOURS
Status: COMPLETED | OUTPATIENT
Start: 2025-01-09 | End: 2025-01-10

## 2025-01-09 RX ORDER — ONDANSETRON 8 MG/1
8 TABLET, ORALLY DISINTEGRATING ORAL EVERY 8 HOURS PRN
Status: DISCONTINUED | OUTPATIENT
Start: 2025-01-09 | End: 2025-01-11 | Stop reason: HOSPADM

## 2025-01-09 RX ORDER — ONDANSETRON HYDROCHLORIDE 2 MG/ML
4 INJECTION, SOLUTION INTRAVENOUS DAILY PRN
Status: DISCONTINUED | OUTPATIENT
Start: 2025-01-09 | End: 2025-01-09 | Stop reason: HOSPADM

## 2025-01-09 RX ORDER — AMOXICILLIN 250 MG
1 CAPSULE ORAL 2 TIMES DAILY
Qty: 14 TABLET | Refills: 0 | Status: SHIPPED | OUTPATIENT
Start: 2025-01-09 | End: 2025-01-18

## 2025-01-09 RX ORDER — TALC
6 POWDER (GRAM) TOPICAL NIGHTLY PRN
Status: DISCONTINUED | OUTPATIENT
Start: 2025-01-09 | End: 2025-01-11 | Stop reason: HOSPADM

## 2025-01-09 RX ORDER — PROMETHAZINE HYDROCHLORIDE 12.5 MG/1
25 TABLET ORAL EVERY 6 HOURS PRN
Status: DISCONTINUED | OUTPATIENT
Start: 2025-01-09 | End: 2025-01-11 | Stop reason: HOSPADM

## 2025-01-09 RX ORDER — OXYCODONE HYDROCHLORIDE 5 MG/1
5 TABLET ORAL ONCE AS NEEDED
Status: DISCONTINUED | OUTPATIENT
Start: 2025-01-09 | End: 2025-01-09 | Stop reason: HOSPADM

## 2025-01-09 RX ORDER — FENTANYL CITRATE 50 UG/ML
25 INJECTION, SOLUTION INTRAMUSCULAR; INTRAVENOUS EVERY 5 MIN PRN
Status: DISCONTINUED | OUTPATIENT
Start: 2025-01-09 | End: 2025-01-09 | Stop reason: HOSPADM

## 2025-01-09 RX ORDER — DEXAMETHASONE SODIUM PHOSPHATE 4 MG/ML
INJECTION, SOLUTION INTRA-ARTICULAR; INTRALESIONAL; INTRAMUSCULAR; INTRAVENOUS; SOFT TISSUE
Status: DISCONTINUED | OUTPATIENT
Start: 2025-01-09 | End: 2025-01-09

## 2025-01-09 RX ORDER — ACETAMINOPHEN 325 MG/1
650 TABLET ORAL EVERY 6 HOURS PRN
Qty: 50 TABLET | Refills: 0 | Status: SHIPPED | OUTPATIENT
Start: 2025-01-09 | End: 2025-02-08

## 2025-01-09 RX ORDER — PROPOFOL 10 MG/ML
VIAL (ML) INTRAVENOUS
Status: DISCONTINUED | OUTPATIENT
Start: 2025-01-09 | End: 2025-01-09

## 2025-01-09 RX ORDER — DOCUSATE SODIUM 100 MG/1
100 CAPSULE, LIQUID FILLED ORAL 2 TIMES DAILY
Status: DISCONTINUED | OUTPATIENT
Start: 2025-01-09 | End: 2025-01-11 | Stop reason: HOSPADM

## 2025-01-09 RX ORDER — LIDOCAINE HYDROCHLORIDE 20 MG/ML
INJECTION INTRAVENOUS
Status: DISCONTINUED | OUTPATIENT
Start: 2025-01-09 | End: 2025-01-09

## 2025-01-09 RX ORDER — OXYCODONE HYDROCHLORIDE 10 MG/1
10 TABLET ORAL EVERY 4 HOURS PRN
Status: DISCONTINUED | OUTPATIENT
Start: 2025-01-09 | End: 2025-01-11 | Stop reason: HOSPADM

## 2025-01-09 RX ORDER — ACETAMINOPHEN 10 MG/ML
INJECTION, SOLUTION INTRAVENOUS
Status: DISCONTINUED | OUTPATIENT
Start: 2025-01-09 | End: 2025-01-09

## 2025-01-09 RX ORDER — SODIUM CHLORIDE 9 MG/ML
INJECTION, SOLUTION INTRAVENOUS CONTINUOUS
Status: DISCONTINUED | OUTPATIENT
Start: 2025-01-09 | End: 2025-01-11 | Stop reason: HOSPADM

## 2025-01-09 RX ORDER — CEFAZOLIN 2 G/1
2 INJECTION, POWDER, FOR SOLUTION INTRAMUSCULAR; INTRAVENOUS
Status: COMPLETED | OUTPATIENT
Start: 2025-01-09 | End: 2025-01-10

## 2025-01-09 RX ORDER — HEPARIN SODIUM 5000 [USP'U]/ML
5000 INJECTION, SOLUTION INTRAVENOUS; SUBCUTANEOUS EVERY 8 HOURS
Status: DISCONTINUED | OUTPATIENT
Start: 2025-01-10 | End: 2025-01-11 | Stop reason: HOSPADM

## 2025-01-09 RX ADMIN — LIDOCAINE HYDROCHLORIDE 100 MG: 20 INJECTION INTRAVENOUS at 12:01

## 2025-01-09 RX ADMIN — HYDROMORPHONE HYDROCHLORIDE 0.2 MG: 1 INJECTION, SOLUTION INTRAMUSCULAR; INTRAVENOUS; SUBCUTANEOUS at 04:01

## 2025-01-09 RX ADMIN — MUPIROCIN: 20 OINTMENT TOPICAL at 09:01

## 2025-01-09 RX ADMIN — VASOPRESSIN 1 UNITS: 20 INJECTION INTRAVENOUS at 02:01

## 2025-01-09 RX ADMIN — OXYCODONE HYDROCHLORIDE 10 MG: 10 TABLET ORAL at 04:01

## 2025-01-09 RX ADMIN — Medication 10 MG: at 02:01

## 2025-01-09 RX ADMIN — ACETAMINOPHEN 1000 MG: 500 TABLET ORAL at 09:01

## 2025-01-09 RX ADMIN — FENTANYL CITRATE 25 MCG: 50 INJECTION, SOLUTION INTRAMUSCULAR; INTRAVENOUS at 03:01

## 2025-01-09 RX ADMIN — MAGNESIUM SULFATE HEPTAHYDRATE 0.5 G: 500 INJECTION, SOLUTION INTRAMUSCULAR; INTRAVENOUS at 02:01

## 2025-01-09 RX ADMIN — VASOPRESSIN 1 UNITS: 20 INJECTION INTRAVENOUS at 01:01

## 2025-01-09 RX ADMIN — GLYCOPYRROLATE 0.2 MG: 0.2 INJECTION, SOLUTION INTRAMUSCULAR; INTRAVENOUS at 12:01

## 2025-01-09 RX ADMIN — CEFAZOLIN 2 G: 2 INJECTION, POWDER, FOR SOLUTION INTRAMUSCULAR; INTRAVENOUS at 09:01

## 2025-01-09 RX ADMIN — PHENYLEPHRINE HYDROCHLORIDE 150 MCG: 10 INJECTION INTRAVENOUS at 01:01

## 2025-01-09 RX ADMIN — HYDROMORPHONE HYDROCHLORIDE 0.2 MG: 1 INJECTION, SOLUTION INTRAMUSCULAR; INTRAVENOUS; SUBCUTANEOUS at 06:01

## 2025-01-09 RX ADMIN — MAGNESIUM SULFATE HEPTAHYDRATE 1 G: 500 INJECTION, SOLUTION INTRAMUSCULAR; INTRAVENOUS at 03:01

## 2025-01-09 RX ADMIN — ROCURONIUM BROMIDE 50 MG: 10 INJECTION, SOLUTION INTRAVENOUS at 12:01

## 2025-01-09 RX ADMIN — SUGAMMADEX 200 MG: 100 INJECTION, SOLUTION INTRAVENOUS at 01:01

## 2025-01-09 RX ADMIN — FAMOTIDINE 20 MG: 20 TABLET ORAL at 09:01

## 2025-01-09 RX ADMIN — DEXAMETHASONE SODIUM PHOSPHATE 8 MG: 4 INJECTION, SOLUTION INTRAMUSCULAR; INTRAVENOUS at 12:01

## 2025-01-09 RX ADMIN — PROPOFOL 100 MG: 10 INJECTION, EMULSION INTRAVENOUS at 12:01

## 2025-01-09 RX ADMIN — PROPOFOL 125 MCG/KG/MIN: 10 INJECTION, EMULSION INTRAVENOUS at 12:01

## 2025-01-09 RX ADMIN — Medication 20 MG: at 01:01

## 2025-01-09 RX ADMIN — PHENYLEPHRINE HYDROCHLORIDE 50 MCG: 10 INJECTION INTRAVENOUS at 01:01

## 2025-01-09 RX ADMIN — SODIUM CHLORIDE: 9 INJECTION, SOLUTION INTRAVENOUS at 05:01

## 2025-01-09 RX ADMIN — SODIUM CHLORIDE: 9 INJECTION, SOLUTION INTRAVENOUS at 02:01

## 2025-01-09 RX ADMIN — DOCUSATE SODIUM 100 MG: 100 CAPSULE, LIQUID FILLED ORAL at 09:01

## 2025-01-09 RX ADMIN — MAGNESIUM SULFATE HEPTAHYDRATE 0.5 G: 500 INJECTION, SOLUTION INTRAMUSCULAR; INTRAVENOUS at 01:01

## 2025-01-09 RX ADMIN — OXYCODONE HYDROCHLORIDE 10 MG: 10 TABLET ORAL at 09:01

## 2025-01-09 RX ADMIN — EPHEDRINE SULFATE 5 MG: 50 INJECTION INTRAVENOUS at 02:01

## 2025-01-09 RX ADMIN — CEFAZOLIN 2 G: 2 INJECTION, POWDER, FOR SOLUTION INTRAMUSCULAR; INTRAVENOUS at 01:01

## 2025-01-09 RX ADMIN — PROPOFOL 20 MG: 10 INJECTION, EMULSION INTRAVENOUS at 12:01

## 2025-01-09 RX ADMIN — SODIUM CHLORIDE, SODIUM GLUCONATE, SODIUM ACETATE, POTASSIUM CHLORIDE, MAGNESIUM CHLORIDE, SODIUM PHOSPHATE, DIBASIC, AND POTASSIUM PHOSPHATE: .53; .5; .37; .037; .03; .012; .00082 INJECTION, SOLUTION INTRAVENOUS at 12:01

## 2025-01-09 RX ADMIN — ACETAMINOPHEN 1000 MG: 10 INJECTION, SOLUTION INTRAVENOUS at 01:01

## 2025-01-09 RX ADMIN — HYDROMORPHONE HYDROCHLORIDE 0.2 MG: 1 INJECTION, SOLUTION INTRAMUSCULAR; INTRAVENOUS; SUBCUTANEOUS at 05:01

## 2025-01-09 RX ADMIN — PHENYLEPHRINE HYDROCHLORIDE 0.3 MCG/KG/MIN: 10 INJECTION INTRAVENOUS at 12:01

## 2025-01-09 RX ADMIN — ROCURONIUM BROMIDE 20 MG: 10 INJECTION, SOLUTION INTRAVENOUS at 01:01

## 2025-01-09 RX ADMIN — ONDANSETRON 4 MG: 2 INJECTION INTRAMUSCULAR; INTRAVENOUS at 12:01

## 2025-01-09 RX ADMIN — SODIUM CHLORIDE 0.2 MCG/KG/MIN: 9 INJECTION, SOLUTION INTRAVENOUS at 12:01

## 2025-01-09 RX ADMIN — MIDAZOLAM HYDROCHLORIDE 2 MG: 2 INJECTION, SOLUTION INTRAMUSCULAR; INTRAVENOUS at 12:01

## 2025-01-09 RX ADMIN — PHENYLEPHRINE HYDROCHLORIDE 200 MCG: 10 INJECTION INTRAVENOUS at 01:01

## 2025-01-09 RX ADMIN — LIDOCAINE HYDROCHLORIDE 10 MG: 10 INJECTION, SOLUTION EPIDURAL; INFILTRATION; INTRACAUDAL; PERINEURAL at 09:01

## 2025-01-09 RX ADMIN — SODIUM CHLORIDE 0.1 MCG/KG/MIN: 9 INJECTION, SOLUTION INTRAVENOUS at 12:01

## 2025-01-09 RX ADMIN — FENTANYL CITRATE 25 MCG: 50 INJECTION, SOLUTION INTRAMUSCULAR; INTRAVENOUS at 04:01

## 2025-01-09 NOTE — DISCHARGE INSTRUCTIONS
DR. VICTORIANO BLANC'S POSTOPERATIVE INSTRUCTIONS -   LUMBAR FUSION       Antibiotics: You do not need additional antibiotics at home.    NSAIDs: Please refrain from taking ibuprofen (Advil), naproxen (Aleve), and other non steroidal anti-inflammatory medications other than the Celebrex that will be prescribed to you after surgery.    Wound Care: You may remove your dressing and shower 7 days after surgery. Until then please keep your wound clean and dry. Sponge baths are acceptable. Do not go in a pool or hot tub until seen in clinic. Please leave the small steri-strips covering your wound in place until they fall off naturally (2 weeks). You may notice clear suture ends hanging from the sides of your incision after the steri-strips are removed, it is ok to clip these with scissors.    Brace: You may be prescribed a brace, please wear this when up and walking, it is not necessary to wear at night when sleeping.    Pain: We will use a multimodal approach for pain management after your surgery.  You will be given a prescription for pain medicine when you are discharged from the hospital.  You will also be given prescriptions for Robaxin (a muscle relaxer), Gabapentin, Celebrex and Tylenol.  Please note: you will only be given ONE prescription for narcotics when you are discharged from the hospital.  This medication is for breakthrough pain only. This medication will not be refilled.  The other medications given to you may be refilled if needed.      Infection: Signs of infection include increasing wound drainage and redness around the wound, as well as a temperature over 101.5 degrees. It is unnecessary to take your temperature on a routine basis. Please call the below number if you are concerned about an infection.    Driving and Work: It is ok to return to driving and work as long as you are not taking narcotic pain medications and can walk greater than 100 feet. Please do not lift over 10 pounds or participate in  exercise or sports until cleared by Dr. Sheikh.    Deep Venous Thrombosis (Blood Clots): Symptoms include swelling in the legs and shortness of breath. Please call the office or proceed to the nearest emergency room if you have any of these symptoms.    Physical Therapy: The best physical therapy immediately after surgery is walking. Please try to walk as much as possible.    Follow-up: You will be scheduled for a follow-up appointment in 4 weeks with either Dr. Sheikh or his physician assistant, Laura Ventura PA-C.    Questions: During business hours please call [phone] for routine questions. For after hours questions please call [phone] and ask to speak with the Orthopaedic resident on call.    Disability: If you submitted short term disability paperwork for us to complete and would like to check the status, please call the Disability Department at [phone].  You may fax any necessary paperwork to [phone].

## 2025-01-09 NOTE — ANESTHESIA POSTPROCEDURE EVALUATION
Anesthesia Post Evaluation    Patient: Rocio Schroeder    Procedure(s) Performed: Procedure(s) (LRB):  FUSION, SPINE, LUMBAR, TLIF, POSTERIOR APPROACH, USING PEDICLE SCREW L3-4 (L), REVISION L3-5 (N/A)    Final Anesthesia Type: general      Patient location during evaluation: PACU  Patient participation: Yes- Able to Participate  Level of consciousness: awake  Post-procedure vital signs: reviewed and stable  Pain management: adequate  Airway patency: patent    PONV status at discharge: No PONV  Anesthetic complications: no      Cardiovascular status: blood pressure returned to baseline  Respiratory status: unassisted  Hydration status: euvolemic  Follow-up not needed.            Vitals Value Taken Time   /84 01/09/25 1601   Temp 97.9 01/09/25 1602   Pulse 109 01/09/25 1601   Resp 15 01/09/25 1601   SpO2 100 % 01/09/25 1601   Vitals shown include unfiled device data.      No case tracking events are documented in the log.      Pain/Narinder Score: No data recorded

## 2025-01-09 NOTE — OP NOTE
DATE OF PROCEDURE: 1/9/2025.     SURGEON: Shon Sheikh M.D.     FIRST ASSISTANT: Jordan Arora MD, PGY-3 Orthopedics     PREOPERATIVE DIAGNOSIS:   1.   L3-4 spondylolisthesis grade 1  2.   Symptomatic lumbar spinal stenosis lumbar radiculopathy  3.   History of L4-5 PLDF/TLIF     POSTOPERATIVE DIAGNOSIS:   Same     PROCEDURES PERFORMED:  Revision posterior lateral fusion L3-5   2.   Posterior nonsegmental instrumentation L3-5  3.   L3 laminectomy, and bilateral foraminotomy for decompression of central and bilateral foraminal stenosis.  4.   Robotic assistance for placement of the pedicle screws (requiring greater than 30 minutes of preoperative planning time)  5.   Auto and allografting  6.   Bilateral paraspinal advancement flap 8 cm x 4 cm     ANESTHESIA: General endotracheal anesthesia.     ESTIMATED BLOOD LOSS: 100 mL.     IMPLANTS: Globus screws  Extra small Infuse  DBM putty      SPECIMENS: None.     FINDINGS: severe L3-4 stenosis.     DRAINS: One deep and one superficial HV drains     COMPLICATIONS: None.     SPONGE AND NEEDLE COUNT: Correct x2.     NEUROLOGICAL MONITORING: Somatosensory potentials, free run EMG, and EMG stimulation lumbar pedicle screws. There were no changes to SSEP baselines. There no significant EMG activity. All screws stimulated at or greater than 20 milliamps.      REASON FOR OPERATION AND BRIEF HISTORY AND PHYSICAL: Rocio Schroeder is a 64 y.o. female with a hx of L4-5 PLDF/TLIF presented with adjacent level L3-4 spondylolisthesis, and refractory lumbar spinal stenosis with radiculopathy.  She has failed extensive conservative management and is here for surgery today.     DESCRIPTION OF INFORMED CONSENT: Please see my last clinic note for a full description of the informed consent I had with the patient.     DESCRIPTION OF PROCEDURE: The patient was met in the preoperative area where all final questions were answered. Their lumbar spine was marked as the operative site. The patient  was then brought to the operating room where anesthesia was induced. The patient was then flipped prone onto the Usama spine table. All bony prominences were padded, paying special attention to the eyes, nose and mouth, axillae, ulnar nerve and hands, genitalia, knees and feet, this was confirmed to be adequate with the anesthesia team. Sequential compression devices were run throughout the case on the bilateral calves. The surgical field was prepped and draped in normal sterile manner after using fluoroscopy to localize our incisoin. A full time-out was then called, verifying patient's identity, surgery, site, and that they had been administered a weight appropriate dose of Ancef, no specific nursing, anesthetic or surgical concerns were identified and it was decided that it was safe to proceed with surgery. I informed all members of the operative team that they were empowered to speak up regarding concerns at any time during the procedure.     I extended the previsou incision and carried dissection down through the skin and subcutaneous tissues using combination of sharp dissection and electrocautery where necessary. The dorsal fascia of the lumbar spine was identified and incised in the midline and I performed a preliminary subperiosteal exposure of the bilateral L2-3, L3-4 and L4-5 facet joints, and what I took to be the L3-5 transverse processes bilaterally. I visualized the L4-5 hardware. I removed the set-screws and rods. Subsequently I performed bilateral L3-4 facetectomies with an osteotome.     We placed a pin in the right posterior superior iliac spine for the robotic array. AP and lateral fluoroscopy images were taken to register to the preoperative CT scan and plan that we had performed prior to the incision.  Once that was done, the robotic arm was used to cannulate pedicle screw tracts bilaterally at L3. Xrays confirmed adequate positioning of the screws on AP and lateral images.     I then began my  neurological decompression. High-speed drill was used to thin the L3 lamina at the level of the pars. This was then removed on block, and I released the ligamentum flavum centrally with a forward angle curette and resected it with a Kerrison punch. I subsequently performed a central as well as bilateral foraminal decompression with a Kerrison punch. There was significant epidural scarring, so care was taken to avoid durotomy. There was significant disc collapse and dural adhesion, so the decision was made to skip the TLIF. Due to the thin nature of the dura, I put Tachosil on top of it.     The wound was then copiously irrigated. Rods were measured, cut, contoured, and locked into place with torque wrench. Infuse sponges were laid on top of bony surfaces. Morselized bone graft, mixed with 1 g vancomycin powder, was laid dorsally along the decorticated transverse processes from L3-5.     500 mg of vancomycin powder was placed in the deep space, and a deep drain was then placed. Bilateral paraspinal muscles were mobilized based on lateral perforators and advanced to obliterate the dead space at midline. The deep fascia was then created 8 cm in length by 4 cm in width in order to create a tension free water-tight closure and to assist with wound healing. The deep fascia was then closed with #1 Vicryl sutures in an interrupted, figure-of-eight fashion. A superficial drain was then placed. The superficial layer was then irrigated and closed over an additional 500 mg of vancomycin powder with 2-0 Vicryl, 3-0 Monocryl, Dermabond and Staples. A soft dressing was then placed. The drains were secured in place with silk sutures. The patient was then flipped supine, extubated and transferred to the Recovery Room in stable condition.    Justification of -22 Modifier: This was a more complex case that usual given the revision nature and scar tissue. This made all aspects of the surgery more difficult, from exposure to  decompression and instrumentation.    Shon Sheikh MD  Orthopaedic Spine Surgeon  Department of Orthopaedic Surgery  [phone]

## 2025-01-09 NOTE — ANESTHESIA PROCEDURE NOTES
Intubation    Date/Time: 1/9/2025 12:31 PM    Performed by: Jacobo Stone CRNA  Authorized by: Washington Hoskins MD    Intubation:     Induction:  Intravenous    Intubated:  Postinduction    Mask Ventilation:  Easy with oral airway    Attempts:  1    Attempted By:  CRNA    Method of Intubation:  Direct    Blade:  Reyna 2    Laryngeal View Grade: Grade I - full view of cords      Difficult Airway Encountered?: No      Complications:  None    Airway Device:  Oral endotracheal tube    Airway Device Size:  7.0    Style/Cuff Inflation:  Cuffed (inflated to minimal occlusive pressure)    Tube secured:  21    Secured at:  The lips    Placement Verified By:  Capnometry    Complicating Factors:  None    Findings Post-Intubation:  BS equal bilateral

## 2025-01-09 NOTE — INTERVAL H&P NOTE
The patient has been examined and the H&P has been reviewed:    I concur with the findings and no changes have occurred since H&P was written.    Surgery risks, benefits and alternative options discussed and understood by patient/family.          Active Hospital Problems    Diagnosis  POA    *Degenerative spondylolisthesis [M43.10]  Yes      Resolved Hospital Problems   No resolved problems to display.

## 2025-01-09 NOTE — TRANSFER OF CARE
Anesthesia Transfer of Care Note    Patient: Rocio Schroeder    Procedure(s) Performed: Procedure(s) (LRB):  FUSION, SPINE, LUMBAR, TLIF, POSTERIOR APPROACH, USING PEDICLE SCREW L3-4 (L), REVISION L3-5 (N/A)    Patient location: PACU    Anesthesia Type: general    Transport from OR: Transported from OR on 6-10 L/min O2 by face mask with adequate spontaneous ventilation    Post pain: adequate analgesia    Post assessment: no apparent anesthetic complications and tolerated procedure well    Post vital signs: stable    Level of consciousness: awake    Nausea/Vomiting: no nausea/vomiting    Complications: none    Transfer of care protocol was followed      Last vitals: Visit Vitals  BP (!) 146/87 (BP Location: Right arm, Patient Position: Lying)   Pulse 97   Temp 36.4 °C (97.5 °F) (Skin)   Resp 20   Ht 5' (1.524 m)   Wt 69.4 kg (153 lb)   SpO2 100%   Breastfeeding No   BMI 29.88 kg/m²

## 2025-01-10 ENCOUNTER — TELEPHONE (OUTPATIENT)
Dept: NEUROSURGERY | Facility: CLINIC | Age: 65
End: 2025-01-10
Payer: COMMERCIAL

## 2025-01-10 LAB
ALBUMIN SERPL BCP-MCNC: 3.1 G/DL (ref 3.5–5.2)
ALP SERPL-CCNC: 57 U/L (ref 40–150)
ALT SERPL W/O P-5'-P-CCNC: 14 U/L (ref 10–44)
ANION GAP SERPL CALC-SCNC: 9 MMOL/L (ref 8–16)
AST SERPL-CCNC: 26 U/L (ref 10–40)
BILIRUB SERPL-MCNC: 0.2 MG/DL (ref 0.1–1)
BUN SERPL-MCNC: 7 MG/DL (ref 8–23)
CALCIUM SERPL-MCNC: 8.1 MG/DL (ref 8.7–10.5)
CHLORIDE SERPL-SCNC: 110 MMOL/L (ref 95–110)
CO2 SERPL-SCNC: 19 MMOL/L (ref 23–29)
CREAT SERPL-MCNC: 0.6 MG/DL (ref 0.5–1.4)
ERYTHROCYTE [DISTWIDTH] IN BLOOD BY AUTOMATED COUNT: 13 % (ref 11.5–14.5)
EST. GFR  (NO RACE VARIABLE): >60 ML/MIN/1.73 M^2
GLUCOSE SERPL-MCNC: 109 MG/DL (ref 70–110)
HCT VFR BLD AUTO: 36.9 % (ref 37–48.5)
HGB BLD-MCNC: 11.9 G/DL (ref 12–16)
MCH RBC QN AUTO: 31.5 PG (ref 27–31)
MCHC RBC AUTO-ENTMCNC: 32.2 G/DL (ref 32–36)
MCV RBC AUTO: 98 FL (ref 82–98)
PLATELET # BLD AUTO: 254 K/UL (ref 150–450)
PMV BLD AUTO: 10.3 FL (ref 9.2–12.9)
POCT GLUCOSE: 107 MG/DL (ref 70–110)
POTASSIUM SERPL-SCNC: 3.9 MMOL/L (ref 3.5–5.1)
PROT SERPL-MCNC: 7 G/DL (ref 6–8.4)
RBC # BLD AUTO: 3.78 M/UL (ref 4–5.4)
SODIUM SERPL-SCNC: 138 MMOL/L (ref 136–145)
WBC # BLD AUTO: 8.36 K/UL (ref 3.9–12.7)

## 2025-01-10 PROCEDURE — 94761 N-INVAS EAR/PLS OXIMETRY MLT: CPT

## 2025-01-10 PROCEDURE — 85027 COMPLETE CBC AUTOMATED: CPT

## 2025-01-10 PROCEDURE — 97535 SELF CARE MNGMENT TRAINING: CPT

## 2025-01-10 PROCEDURE — 11000001 HC ACUTE MED/SURG PRIVATE ROOM

## 2025-01-10 PROCEDURE — 97161 PT EVAL LOW COMPLEX 20 MIN: CPT

## 2025-01-10 PROCEDURE — 97116 GAIT TRAINING THERAPY: CPT

## 2025-01-10 PROCEDURE — 63600175 PHARM REV CODE 636 W HCPCS

## 2025-01-10 PROCEDURE — 25000003 PHARM REV CODE 250

## 2025-01-10 PROCEDURE — 36415 COLL VENOUS BLD VENIPUNCTURE: CPT

## 2025-01-10 PROCEDURE — 94799 UNLISTED PULMONARY SVC/PX: CPT

## 2025-01-10 PROCEDURE — 80053 COMPREHEN METABOLIC PANEL: CPT

## 2025-01-10 RX ADMIN — FAMOTIDINE 20 MG: 20 TABLET ORAL at 09:01

## 2025-01-10 RX ADMIN — DOCUSATE SODIUM 100 MG: 100 CAPSULE, LIQUID FILLED ORAL at 09:01

## 2025-01-10 RX ADMIN — HEPARIN SODIUM 5000 UNITS: 5000 INJECTION INTRAVENOUS; SUBCUTANEOUS at 05:01

## 2025-01-10 RX ADMIN — OXYCODONE HYDROCHLORIDE 10 MG: 10 TABLET ORAL at 04:01

## 2025-01-10 RX ADMIN — OXYCODONE 5 MG: 5 TABLET ORAL at 12:01

## 2025-01-10 RX ADMIN — OXYCODONE HYDROCHLORIDE 10 MG: 10 TABLET ORAL at 09:01

## 2025-01-10 RX ADMIN — CEFAZOLIN 2 G: 2 INJECTION, POWDER, FOR SOLUTION INTRAMUSCULAR; INTRAVENOUS at 05:01

## 2025-01-10 RX ADMIN — MUPIROCIN: 20 OINTMENT TOPICAL at 09:01

## 2025-01-10 RX ADMIN — OXYCODONE 5 MG: 5 TABLET ORAL at 09:01

## 2025-01-10 RX ADMIN — ACETAMINOPHEN 1000 MG: 500 TABLET ORAL at 02:01

## 2025-01-10 RX ADMIN — POLYETHYLENE GLYCOL 3350 17 G: 17 POWDER, FOR SOLUTION ORAL at 09:01

## 2025-01-10 RX ADMIN — OXYCODONE 5 MG: 5 TABLET ORAL at 05:01

## 2025-01-10 RX ADMIN — HEPARIN SODIUM 5000 UNITS: 5000 INJECTION INTRAVENOUS; SUBCUTANEOUS at 09:01

## 2025-01-10 RX ADMIN — ACETAMINOPHEN 1000 MG: 500 TABLET ORAL at 05:01

## 2025-01-10 RX ADMIN — HEPARIN SODIUM 5000 UNITS: 5000 INJECTION INTRAVENOUS; SUBCUTANEOUS at 02:01

## 2025-01-10 NOTE — PT/OT/SLP EVAL
Physical Therapy Evaluation    Patient Name:  Rocio Schroeder   MRN:  [id number]    Recommendations:     Discharge Recommendations: Low Intensity Therapy   Discharge Equipment Recommendations: none   Barriers to discharge: None    Assessment:     Rocio Schroeder is a 64 y.o. female admitted with a medical diagnosis of Degenerative spondylolisthesis.  She presents with the following impairments/functional limitations: weakness, impaired endurance, impaired functional mobility, gait instability, impaired balance, pain, impaired skin, edema, orthopedic precautions Patient with good therapeutic participation this date, motivated to improve despite back pain. Encountered seated in chair and pleasantly agreeable to therapy, tolerated ambulation in hallway without incident. Patient will continue to benefit from skilled PT during this admit to address BLE strength and endurance deficits, and maximize independence with functional mobility.    Rehab Prognosis: Good; patient would benefit from acute skilled PT services to address these deficits and reach maximum level of function.    Recent Surgery: Procedure(s) (LRB):  FUSION, SPINE, LUMBAR, TLIF, POSTERIOR APPROACH, USING PEDICLE SCREW L3-4 (L), REVISION L3-5 (N/A) 1 Day Post-Op    Plan:     During this hospitalization, patient to be seen 4 x/week to address the identified rehab impairments via gait training, therapeutic activities, therapeutic exercises, neuromuscular re-education and progress toward the following goals:    Plan of Care Expires:  02/09/25    Subjective     Chief Complaint: back pain  Patient/Family Comments/goals: improve independence, gait distance  Pain/Comfort:  Pain Rating 1: 7/10  Location - Orientation 1: medial  Location 1: back  Pain Addressed 1: Pre-medicate for activity  Pain Rating Post-Intervention 1: 7/10    Patients cultural, spiritual, Jew conflicts given the current situation: no    Living Environment:  Patient lives with her  in  a SSH with 0 RAHEEL. Bathroom contains tub-shower combo with grab bars and seat inside.  Prior to admission, patients level of function was independent with functional mobility and ADLs. 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 Equipment used at home: grab bar, shower chair.  DME owned (not currently used): rolling walker and transport chair .  Upon discharge, patient will have assistance from , who is retired.    Objective:     Communicated with RN prior to session.  Patient found up in chair with hemovac, SCD, guardado catheter  upon PT entry to room.    General Precautions: Standard, fall  Orthopedic Precautions:spinal precautions   Braces: N/A  Respiratory Status: Room air    Exams:  Cognitive Exam:  Patient is oriented to Person, Place, Time, and Situation  Gross Motor Coordination:  WFL  Postural Exam:  Patient presented with the following abnormalities:    -       Rounded shoulders  RLE ROM: WFL  RLE Strength: grossly 3+/5  LLE ROM: WFL  LLE Strength: grossly 3+/5    Functional Mobility:  Bed Mobility:  not assessed this date, as patient found/left in chair - but demonstrated + educated on log roll technique to ensure compliance to spinal precautions  Transfers:     Sit to Stand:  stand by assistance with rolling walker and from chair - cues for hand placement + sequencing  Toilet Transfer: contact guard assistance with  rolling walker and grab bars  using  Step Transfer  Gait: 98 ft with RW  + SBA  Minimal verbal cues for AD management with good carryover, no increases in pain reported, small step length but reciprocal gait pattern noted, one standing rest break, good control during turns  Balance:   Sitting: good  Standing: SBA + BUE support on RW      AM-PAC 6 CLICK MOBILITY  Total Score:18       Treatment & Education:  Patient educated on calling for assistance for any needs to improve overall safety awareness.  Patient educated on role of PT in acute care, PT POC, and PT  goals.  Patient required co-evaluation with OT for highest quality care d/t decreased activity tolerance and increased level of assistance necessary.  All items placed within reach, and notified on call light usage for assistance with any needs for fall prevention.  Patient educated on importance of OOB activity to promote overall endurance.  Spinal precautions adhered, added to whiteboard, edu on log roll and car transfer    Patient left up in chair with all lines intact, call button in reach, and  + RN present.    GOALS:   Multidisciplinary Problems       Physical Therapy Goals          Problem: Physical Therapy    Goal Priority Disciplines Outcome Interventions   Physical Therapy Goal     PT, PT/OT Progressing    Description: Goals to be met by: 25     Patient will increase functional independence with mobility by performin. Supine to sit with Modified [place]  2. Sit to supine with Modified [place]  3. Sit to stand transfer with Modified [place]  4. Bed to chair transfer with Modified [place] using Rolling Walker or LRAD  5. Gait  x 300 feet with Modified [place] using Rolling Walker or LRAD.   6. Lower extremity exercise program x15 reps per handout, with assistance as needed                         History:     Past Medical History:   Diagnosis Date    Headache     Hypertension        Past Surgical History:   Procedure Laterality Date     SECTION      , ,     FUSION, SPINE, LUMBAR, TLIF, POSTERIOR APPROACH, USING PEDICLE SCREW N/A 2025    Procedure: FUSION, SPINE, LUMBAR, TLIF, POSTERIOR APPROACH, USING PEDICLE SCREW L3-4 (L), REVISION L3-5;  Surgeon: Shon Sheikh MD;  Location: [facility] OR [address] [place] [address];  Service: Neurosurgery;  Laterality: N/A;    HYSTERECTOMY      INJECTION OF FACET JOINT Bilateral 08/10/2023    Procedure: FACET JOINT INJECTION BILATERAL L5/S1 *KAVON PT*;  Surgeon: Rashid Cook MD;  Location: [facility];  Service: Pain  Management;  Laterality: Bilateral;    left knee surgery- 2018/2019         Time Tracking:     PT Received On: 01/10/25  PT Start Time: 1002     PT Stop Time: 1029  PT Total Time (min): 27 min     Billable Minutes: Evaluation 13 and Gait Training 14      01/10/2025

## 2025-01-10 NOTE — PLAN OF CARE
Pt AAOx4 and VSS . Progressing with plan of care. Free of skin breakdown as the pt positioned/repositioned well independently. Clean, dry, and intact dressing noted on back. Hemovac in place and care performed. SCDs in place at all times. Incentive spirometer at bedside and pt instructed on its use. Pain controlled well with PRN meds. Frequent rounds made to assess pain and safety and no complaints at this time noted. Side rails up x 2. Bed locked. Call light within reach. No falls noted. Will continue to monitor.    Problem: Adult Inpatient Plan of Care  Goal: Plan of Care Review  Outcome: Progressing  Goal: Patient-Specific Goal (Individualized)  Outcome: Progressing  Goal: Absence of Hospital-Acquired Illness or Injury  Outcome: Progressing  Goal: Optimal Comfort and Wellbeing  Outcome: Progressing  Goal: Readiness for Transition of Care  Outcome: Progressing     Problem: Infection  Goal: Absence of Infection Signs and Symptoms  Outcome: Progressing     Problem: Wound  Goal: Optimal Coping  Outcome: Progressing  Goal: Optimal Functional Ability  Outcome: Progressing  Goal: Absence of Infection Signs and Symptoms  Outcome: Progressing  Goal: Improved Oral Intake  Outcome: Progressing  Goal: Optimal Pain Control and Function  Outcome: Progressing  Goal: Skin Health and Integrity  Outcome: Progressing  Goal: Optimal Wound Healing  Outcome: Progressing

## 2025-01-10 NOTE — PLAN OF CARE
Problem: Occupational Therapy  Goal: Occupational Therapy Goal  Description: Goals to be met by: 1/24/25     Patient will increase functional independence with ADLs by performing:    UE Dressing with Modified [place].  LE Dressing with Modified [place] and Assistive Devices as needed.  Grooming while standing at sink with Modified [place].  Toileting from toilet with Modified [place] for hygiene and clothing management.   Bathing from  EOB with Set-up Assistance.  Sitting at edge of bed x20 minutes with Modified [place].  Rolling to Bilateral with Modified [place].   Toilet transfer to toilet with Modified [place].    Outcome: Progressing   OT 's initial evaluation completed and POC established.

## 2025-01-10 NOTE — PT/OT/SLP EVAL
Occupational Therapy Co-Evaluation and Treatment    Name: Rocio Schroeder  MRN: [id number]  Admitting Diagnosis: Degenerative spondylolisthesis 1 Day Post-Op  Recent Surgery: Procedure(s) (LRB):  FUSION, SPINE, LUMBAR, TLIF, POSTERIOR APPROACH, USING PEDICLE SCREW L3-4 (L), REVISION L3-5 (N/A) 1 Day Post-Op  Pt was co-evaluated with Physical Therapy to assess abilities and/or deficits for appropriate skilled interventions due to coordinating within pain schedule/tolerance and for increased safety.    Recommendations:     Discharge Recommendations: Low Intensity Therapy  Level of Assistance Recommended: Intermittent assistance  Discharge Equipment Recommendations: hip kit  Barriers to discharge: None    Assessment:     Rocio Schroeder is a 64 y.o. female with a medical diagnosis of Degenerative spondylolisthesis. She presents with performance deficits affecting function including orthopedic precautions, decreased ROM, pain, gait instability, impaired balance, impaired functional mobility, impaired self care skills. Pt able to participate well in occupational therapy evaluation with good initiation and motivation. Improving with activity and pain tolerance s/p back surgery. Pt's participation in LB ADLs are impacted by her spinal precautions and would benefit from skilled OT to establish ADL routine and educate on compensatory strategies before discharging home.     Rehab Prognosis: Good; patient would benefit from acute OT services to address these deficits and reach maximum level of function.    Plan:     Patient to be seen 3 x/week to address the above listed problems via self-care/home management, community/work re-entry, therapeutic activities, therapeutic exercises, neuromuscular re-education  Plan of Care Expires: 01/24/25  Plan of Care Reviewed with: patient, spouse    Subjective     Chief Complaint: none  Patient Comments/Goals: ready to return home  Pain/Comfort:  Pain Rating 1: 7/10  Pain Addressed 1:  Pre-medicate for activity    Patients cultural, spiritual, Judaism conflicts given the current situation: no    Social History:  Living Environment: Patient lives with their spouse in a single story home with tub-shower combo and grab bars  Prior Level of Function: Prior to admission, patient was independent.  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 Reported that it is a physically demanding job and that she should be allowed x12 weeks off for recovery.  Equipment Used at Home: grab bar, shower chair, walker, rolling  DME owned (not currently used): rolling walker  Assistance Upon Discharge: family    Objective:     Communicated with nurse Mauro prior to session. Patient found up in chair finishing with mobility [employer]'s session with hemovac, SCD, guardado catheter upon OT entry to room.    General Precautions: Standard, fall   Orthopedic Precautions: spinal precautions   Braces: N/A    Respiratory Status: Room air    Occupational Performance    Gait belt applied - No    Bed Mobility:   Deferred at this time    Functional Mobility/Transfers:  Sit <> Stand Transfer with stand by assistance with rolling walker  Toilet Transfer Step Transfer technique with contact guard assistance with rolling walker and grab bars  Functional Mobility: SBA gait with Rw in the hallway with Physical therapist. Min cues for positioning of RW.     Activities of Daily Living:  Grooming: stand by assistance    Cognitive/Visual Perceptual:  Cognitive/Psychosocial Skills:    -     Oriented to: Person, Place, Time, Situation  -     Follows Commands/attention: Follows multistep  commands  -     Memory: No Deficits noted  -     Safety awareness/insight to disability: intact  -     Mood/Affect/Coping skills/emotional control: Appropriate to situation  Visual/Perceptual:    -     Intact    Physical Exam:  Balance:    -     Sitting: modified independence  -     Standing: stand by  assistance  Sensation:    -       Intact  Motor Planning: Intact  Dominant hand: Right  Upper Extremity Range of Motion:     -       Right Upper Extremity: WNL  -       Left Upper Extremity: WNL  Upper Extremity Strength:    -       Right Upper Extremity: WFL  -       Left Upper Extremity: WFL   Strength:    -       Right Upper Extremity: WFL  -       Left Upper Extremity: WFL  Fine Motor Coordination:    -       Intact  Gross motor coordination:   WFL    AMPAC 6 Click ADL:  AMPAC Total Score: 20    Treatment & Education:  Educated about Spinal precautions including no bending, lifting, or twisting. Patient recalled 3 spinal precautions  Educated on safety with functional mobility; hand placement to ensure safe transfers to various surfaces in prep for ADLs  Educated on performing functional mobility and ADLs in adherence to orthopedic precautions  Pt educated on the following topics:  OT 's plan of care and purpose of visit, ADLs, importance of increased activity in hospital setting, transfer training, bed mobility, body mechanics, assistive device, modifications/compensatory strategies, sequencing, safety precautions, fall prevention, post-op precautions, equipment recommendations, home safety, energy conservation techniques, and to call for assistance with call button  Understanding was verbalized.     Patient clear to ambulate in hallways with RN/PCT with use of RW    Patient left up in chair with all lines intact, call button in reach, and spouse present.    GOALS:   Multidisciplinary Problems       Occupational Therapy Goals          Problem: Occupational Therapy    Goal Priority Disciplines Outcome Interventions   Occupational Therapy Goal     OT, PT/OT Progressing    Description: Goals to be met by: 1/24/25     Patient will increase functional independence with ADLs by performing:    UE Dressing with Modified [place].  LE Dressing with Modified [place] and Assistive Devices as needed.  Grooming  while standing at sink with Modified [place].  Toileting from toilet with Modified [place] for hygiene and clothing management.   Bathing from  EOB with Set-up Assistance.  Sitting at edge of bed x20 minutes with Modified [place].  Rolling to Bilateral with Modified [place].   Toilet transfer to toilet with Modified [place].                         History:     Past Medical History:   Diagnosis Date    Headache     Hypertension          Past Surgical History:   Procedure Laterality Date     SECTION      , ,     FUSION, SPINE, LUMBAR, TLIF, POSTERIOR APPROACH, USING PEDICLE SCREW N/A 2025    Procedure: FUSION, SPINE, LUMBAR, TLIF, POSTERIOR APPROACH, USING PEDICLE SCREW L3-4 (L), REVISION L3-5;  Surgeon: Shon Sheikh MD;  Location: [facility] OR [address] [place] [address];  Service: Neurosurgery;  Laterality: N/A;    HYSTERECTOMY      INJECTION OF FACET JOINT Bilateral 08/10/2023    Procedure: FACET JOINT INJECTION BILATERAL L5/S1 *KAVON PT*;  Surgeon: Rashid Cook MD;  Location: [facility];  Service: Pain Management;  Laterality: Bilateral;    left knee surgery-          Time Tracking:     OT Date of Treatment: 01/10/25  OT Start Time: 1002  OT Stop Time: 1029  OT Total Time (min): 27 min    Billable Minutes: Evaluation 15 and Self Care/Home Management 12    1/10/2025

## 2025-01-10 NOTE — PROGRESS NOTES
[facility]  Orthopedics  Progress Note    Patient Name: Rocio Schroeder  MRN: [id number]  Admission Date: 1/9/2025  Hospital Length of Stay: 1 days  Attending Provider: Shon Sheikh MD  Primary Care Provider: Chao Macedo MD  Follow-up For: Procedure(s) (LRB):  FUSION, SPINE, LUMBAR, TLIF, POSTERIOR APPROACH, USING PEDICLE SCREW L3-4 (L), REVISION L3-5 (N/A)    Post-Operative Day: 1 Day Post-Op  Subjective:     Principal Problem:Degenerative spondylolisthesis    Principal Orthopedic Problem: s/p L3-5 PDSF revision    Interval History: Patient seen and examined at bedside. NAEON. Patient doing well. Pain well controlled. Anticipate mobilization with PT.     Review of patient's allergies indicates:  No Known Allergies    Current Facility-Administered Medications   Medication    0.9% NaCl infusion    acetaminophen tablet 1,000 mg    bisacodyL suppository 10 mg    docusate sodium capsule 100 mg    famotidine tablet 20 mg    heparin (porcine) injection 5,000 Units    melatonin tablet 6 mg    mupirocin 2 % ointment    ondansetron disintegrating tablet 8 mg    oxyCODONE immediate release tablet 5 mg    oxyCODONE immediate release tablet Tab 10 mg    polyethylene glycol packet 17 g    promethazine tablet 25 mg     Objective:     Vital Signs (Most Recent):  Temp: 98.9 °F (37.2 °C) (01/10/25 1210)  Pulse: 75 (01/10/25 1210)  Resp: 17 (01/10/25 1210)  BP: (!) 144/73 (01/10/25 1210)  SpO2: 97 % (01/10/25 1210) Vital Signs (24h Range):  Temp:  [97.9 °F (36.6 °C)-98.9 °F (37.2 °C)] 98.9 °F (37.2 °C)  Pulse:  [] 75  Resp:  [15-18] 17  SpO2:  [95 %-100 %] 97 %  BP: (131-157)/(66-93) 144/73     Weight: 70.9 kg (156 lb 4.9 oz)  Height: 5' (152.4 cm)  Body mass index is 30.53 kg/m².      Intake/Output Summary (Last 24 hours) at 1/10/2025 1243  Last data filed at 1/10/2025 0913  Gross per 24 hour   Intake 1800 ml   Output 2085 ml   Net -285 ml        Ortho/SPM Exam  Lumbar spine exam   EHL/FHL/TA/Gastroc intact    SILT   2+DP/PT   Dressings CDI   Drains 2      Significant Labs: All pertinent labs within the past 24 hours have been reviewed.    Significant Imaging: I have reviewed and interpreted all pertinent imaging results/findings.  Assessment/Plan:     * Degenerative spondylolisthesis  Rocio Schroeder is a 64 y.o. female is s/p L3-5 PDLF on 1/9    Surgical dressing C/D/I  Pain control: multimodal  PT/OT: WBAT BLE, spine precautions  DVT PPx: SQH TID, FCDs at all times when not ambulating  Abx: postop Ancef x 24hrs   Drain: x2  Lucia: Remove POD 1   Nursing: Incentive spirometry, Monitor and record drain output each shift     Dispo: pending PT    Output by Drain (mL) 01/08/25 0701 - 01/08/25 1900 01/08/25 1901 - 01/09/25 0700 01/09/25 0701 - 01/09/25 1900 01/09/25 1901 - 01/10/25 0700 01/10/25 0701 - 01/10/25 1244        Closed/Suction Drain 01/09/25 1504 Tube - 1 Right Back Accordion 10 Fr.   20 45 20        Closed/Suction Drain 01/09/25 1519 Tube - 2 Left Back Accordion 10 Fr.    0 0                LUPIS Arora MD  Orthopedics  [facility] - Surgery

## 2025-01-10 NOTE — PLAN OF CARE
[facility]  Initial Discharge Assessment       Primary Care Provider: Chao Macedo MD    Admission Diagnosis: History of lumbar fusion [Z98.1]  Degenerative spondylolisthesis [M43.10]    Admission Date: 1/9/2025  Expected Discharge Date: 1/10/2025    Transition of Care Barriers: None    Payor: BLUE CROSS BLUE SHIELD / Plan: BCBS ALL OUT OF STATE / Product Type: PPO /     Extended Emergency Contact Information  Primary Emergency Contact: Todd Schroeder  Home Phone: [phone]  Mobile Phone: [phone]  Relation: Spouse  Preferred language: English    Discharge Plan A: Home with family  Discharge Plan B: Home with family, Home Health      [employer] - [place], [place] - [address] [facility]  [address] [facility] [address]  Phone: [phone] Fax: [phone]      Initial Assessment (most recent)       Adult Discharge Assessment - 01/10/25 0931          Discharge Assessment    Assessment Type Discharge Planning Assessment     Confirmed/corrected address, phone number and insurance Yes     Confirmed Demographics Correct on Facesheet     Source of Information patient     Communicated DINESH with patient/caregiver Yes     People in Home spouse     Do you expect to return to your current living situation? Yes     Do you have help at home or someone to help you manage your care at home? Yes     Who are your caregiver(s) and their phone number(s)? Spouse     Prior to hospitilization cognitive status: Alert/Oriented     Current cognitive status: Alert/Oriented     Walking or Climbing Stairs Difficulty no     Dressing/Bathing Difficulty no     Home Layout Able to live on 1st floor     Equipment Currently Used at Home none     Readmission within 30 days? No     Patient currently being followed by outpatient case management? No     Do you currently have service(s) that help you manage your care at home? No     Do you take prescription medications? Yes     Do you have prescription coverage? Yes     Do you  have any problems affording any of your prescribed medications? No     Is the patient taking medications as prescribed? yes     How do you get to doctors appointments? car, drives self;family or friend will provide     Are you on dialysis? No     Do you take coumadin? No     Discharge Plan A Home with family     Discharge Plan B Home with family;Home Health     DME Needed Upon Discharge  none     Discharge Plan discussed with: Patient     Transition of Care Barriers None                       SW completed discharge planning assessment with the patient at bedside. SW verified demographic information listed on the pt.'s Face sheet. Pt plans to discharge to the [facility] with her spouse until follow up appointment (1/16). Pt reports that she lives in a single story home with her spouse, whom she plans to help manage her care and provide transport upon discharge. Pt denies any needs at this time.SW is following this Pt for DC planning needs. There are no identified needs at this time.    Discharge Plan A and Plan B have been determined by review of patient's clinical status, future medical and therapeutic needs, and coverage/benefits for post-acute care in coordination with multidisciplinary team members.    Kelley Kendrick LCSW  Case Management   [facility]   Ext. [phone]

## 2025-01-10 NOTE — NURSING TRANSFER
Nursing Transfer Note      1/9/2025   7:36 PM    Nurse giving handoff: CASS Soriano  Nurse receiving handoff:CASS Knox      Transfer To: 510    Transfer via bed    Transfer with IV pole and fluids    Transported by RN and PCT      Order for Tele Monitor? No    Additional Lines: Lucia Catheter and 2 accordion drains    Medicines sent: Normal Saline      Patient belongings transferred with patient: No    Chart send with patient: Yes    Notified: spouse    Patient reassessed at: 1900

## 2025-01-10 NOTE — PLAN OF CARE
PT Evaluation completed 1/10/2025   PT goals and POC established.   Low intensity therapy.     Problem: Physical Therapy  Goal: Physical Therapy Goal  Description: Goals to be met by: 25     Patient will increase functional independence with mobility by performin. Supine to sit with Modified [place]  2. Sit to supine with Modified [place]  3. Sit to stand transfer with Modified [place]  4. Bed to chair transfer with Modified [place] using Rolling Walker or LRAD  5. Gait  x 300 feet with Modified [place] using Rolling Walker or LRAD.   6. Lower extremity exercise program x15 reps per handout, with assistance as needed    Outcome: Progressing

## 2025-01-10 NOTE — SUBJECTIVE & OBJECTIVE
Principal Problem:Degenerative spondylolisthesis    Principal Orthopedic Problem: s/p L3-5 PDSF revision    Interval History: Patient seen and examined at bedside. NAEON. Patient doing well. Pain well controlled. Anticipate mobilization with PT.     Review of patient's allergies indicates:  No Known Allergies    Current Facility-Administered Medications   Medication    0.9% NaCl infusion    acetaminophen tablet 1,000 mg    bisacodyL suppository 10 mg    docusate sodium capsule 100 mg    famotidine tablet 20 mg    heparin (porcine) injection 5,000 Units    melatonin tablet 6 mg    mupirocin 2 % ointment    ondansetron disintegrating tablet 8 mg    oxyCODONE immediate release tablet 5 mg    oxyCODONE immediate release tablet Tab 10 mg    polyethylene glycol packet 17 g    promethazine tablet 25 mg     Objective:     Vital Signs (Most Recent):  Temp: 98.9 °F (37.2 °C) (01/10/25 1210)  Pulse: 75 (01/10/25 1210)  Resp: 17 (01/10/25 1210)  BP: (!) 144/73 (01/10/25 1210)  SpO2: 97 % (01/10/25 1210) Vital Signs (24h Range):  Temp:  [97.9 °F (36.6 °C)-98.9 °F (37.2 °C)] 98.9 °F (37.2 °C)  Pulse:  [] 75  Resp:  [15-18] 17  SpO2:  [95 %-100 %] 97 %  BP: (131-157)/(66-93) 144/73     Weight: 70.9 kg (156 lb 4.9 oz)  Height: 5' (152.4 cm)  Body mass index is 30.53 kg/m².      Intake/Output Summary (Last 24 hours) at 1/10/2025 1243  Last data filed at 1/10/2025 0913  Gross per 24 hour   Intake 1800 ml   Output 2085 ml   Net -285 ml        Ortho/SPM Exam  Lumbar spine exam   EHL/FHL/TA/Gastroc intact   SILT   2+DP/PT   Dressings CDI   Drains 2      Significant Labs: All pertinent labs within the past 24 hours have been reviewed.    Significant Imaging: I have reviewed and interpreted all pertinent imaging results/findings.

## 2025-01-10 NOTE — ASSESSMENT & PLAN NOTE
Rocio Schroeder is a 64 y.o. female is s/p L3-5 PDLF on 1/9    Surgical dressing C/D/I  Pain control: multimodal  PT/OT: WBAT BLE, spine precautions  DVT PPx: SQH TID, FCDs at all times when not ambulating  Abx: postop Ancef x 24hrs   Drain: x2  Lucia: Remove POD 1   Nursing: Incentive spirometry, Monitor and record drain output each shift     Dispo: pending PT    Output by Drain (mL) 01/08/25 0701 - 01/08/25 1900 01/08/25 1901 - 01/09/25 0700 01/09/25 0701 - 01/09/25 1900 01/09/25 1901 - 01/10/25 0700 01/10/25 0701 - 01/10/25 1244        Closed/Suction Drain 01/09/25 1504 Tube - 1 Right Back Accordion 10 Fr.   20 45 20        Closed/Suction Drain 01/09/25 1519 Tube - 2 Left Back Accordion 10 Fr.    0 0

## 2025-01-10 NOTE — TELEPHONE ENCOUNTER
Rounded on patient earlier, in hospital room. Patient was sitting up in recliner, feeling good, patient stated she has good pain control, had ambulated earlier.     DC plan to [facility] with family member.    Future Appointments   Date Time Provider Department Center   1/16/2025  7:15 AM SHALA Montanez, NP [facility] SPINE Eladio Castañeda, RN, CMSRN  RN Navigator  [facility]   [phone]  Fax [phone]        
Yes

## 2025-01-11 VITALS
OXYGEN SATURATION: 96 % | WEIGHT: 156.31 LBS | HEIGHT: 60 IN | BODY MASS INDEX: 30.69 KG/M2 | TEMPERATURE: 98 F | DIASTOLIC BLOOD PRESSURE: 66 MMHG | HEART RATE: 70 BPM | RESPIRATION RATE: 18 BRPM | SYSTOLIC BLOOD PRESSURE: 143 MMHG

## 2025-01-11 PROCEDURE — 63600175 PHARM REV CODE 636 W HCPCS

## 2025-01-11 PROCEDURE — 97530 THERAPEUTIC ACTIVITIES: CPT

## 2025-01-11 PROCEDURE — 97116 GAIT TRAINING THERAPY: CPT | Mod: CQ

## 2025-01-11 PROCEDURE — 97530 THERAPEUTIC ACTIVITIES: CPT | Mod: CQ

## 2025-01-11 PROCEDURE — 94761 N-INVAS EAR/PLS OXIMETRY MLT: CPT

## 2025-01-11 PROCEDURE — 25000003 PHARM REV CODE 250

## 2025-01-11 RX ADMIN — POLYETHYLENE GLYCOL 3350 17 G: 17 POWDER, FOR SOLUTION ORAL at 08:01

## 2025-01-11 RX ADMIN — OXYCODONE HYDROCHLORIDE 10 MG: 10 TABLET ORAL at 01:01

## 2025-01-11 RX ADMIN — HEPARIN SODIUM 5000 UNITS: 5000 INJECTION INTRAVENOUS; SUBCUTANEOUS at 06:01

## 2025-01-11 RX ADMIN — DOCUSATE SODIUM 100 MG: 100 CAPSULE, LIQUID FILLED ORAL at 08:01

## 2025-01-11 RX ADMIN — OXYCODONE HYDROCHLORIDE 10 MG: 10 TABLET ORAL at 10:01

## 2025-01-11 RX ADMIN — OXYCODONE HYDROCHLORIDE 10 MG: 10 TABLET ORAL at 02:01

## 2025-01-11 RX ADMIN — OXYCODONE HYDROCHLORIDE 10 MG: 10 TABLET ORAL at 05:01

## 2025-01-11 RX ADMIN — SODIUM CHLORIDE, POTASSIUM CHLORIDE, SODIUM LACTATE AND CALCIUM CHLORIDE 500 ML: 600; 310; 30; 20 INJECTION, SOLUTION INTRAVENOUS at 12:01

## 2025-01-11 RX ADMIN — FAMOTIDINE 20 MG: 20 TABLET ORAL at 08:01

## 2025-01-11 RX ADMIN — MUPIROCIN: 20 OINTMENT TOPICAL at 08:01

## 2025-01-11 NOTE — PROGRESS NOTES
[facility] - Surgery  Orthopedics  Progress Note    Patient Name: Rocio Schroeder  MRN: [id number]  Admission Date: 1/9/2025  Hospital Length of Stay: 2 days  Attending Provider: Shon Sheikh MD  Primary Care Provider: Chao Macedo MD  Follow-up For: Procedure(s) (LRB):  FUSION, SPINE, LUMBAR, TLIF, POSTERIOR APPROACH, USING PEDICLE SCREW L3-4 (L), REVISION L3-5 (N/A)    Post-Operative Day: 2 Days Post-Op  Subjective:     Principal Problem:Degenerative spondylolisthesis    Principal Orthopedic Problem: s/p L3-5 PDSF revision    Interval History: Patient seen and examined at bedside. NAEON. Patient doing well. Pain well controlled. Worked with PT/OT yesterday without issues.     Review of patient's allergies indicates:  No Known Allergies    Current Facility-Administered Medications   Medication    0.9% NaCl infusion    bisacodyL suppository 10 mg    docusate sodium capsule 100 mg    famotidine tablet 20 mg    heparin (porcine) injection 5,000 Units    melatonin tablet 6 mg    mupirocin 2 % ointment    ondansetron disintegrating tablet 8 mg    oxyCODONE immediate release tablet 5 mg    oxyCODONE immediate release tablet Tab 10 mg    polyethylene glycol packet 17 g    promethazine tablet 25 mg     Objective:     Vital Signs (Most Recent):  Temp: 97.7 °F (36.5 °C) (01/11/25 0802)  Pulse: 85 (01/11/25 0802)  Resp: 20 (01/11/25 0802)  BP: 137/71 (01/11/25 0802)  SpO2: 99 % (01/11/25 0802) Vital Signs (24h Range):  Temp:  [97.6 °F (36.4 °C)-99.1 °F (37.3 °C)] 97.7 °F (36.5 °C)  Pulse:  [53-85] 85  Resp:  [16-20] 20  SpO2:  [94 %-99 %] 99 %  BP: ()/(45-81) 137/71     Weight: 70.9 kg (156 lb 4.9 oz)  Height: 5' (152.4 cm)  Body mass index is 30.53 kg/m².      Intake/Output Summary (Last 24 hours) at 1/11/2025 0828  Last data filed at 1/10/2025 2200  Gross per 24 hour   Intake --   Output 40 ml   Net -40 ml        Ortho/SPM Exam  Lumbar spine exam   EHL/FHL/TA/Gastroc intact   SILT   2+DP/PT   Dressings CDI    Drains 2      Significant Labs: All pertinent labs within the past 24 hours have been reviewed.    Significant Imaging: I have reviewed and interpreted all pertinent imaging results/findings.  Assessment/Plan:     * Degenerative spondylolisthesis  Rocio Schroeder is a 64 y.o. female is s/p L3-5 PDLF on 1/9    Surgical dressing C/D/I  Pain control: multimodal  PT/OT: WBAT BLE, spine precautions  DVT PPx: SQH TID, FCDs at all times when not ambulating  Abx: postop Ancef x 24hrs   Drain: x2  Lucia: Remove POD 1   Nursing: Incentive spirometry, Monitor and record drain output each shift     Dispo: pending PT    Output by Drain (mL) 01/09/25 0701 - 01/09/25 1900 01/09/25 1901 - 01/10/25 0700 01/10/25 0701 - 01/10/25 1900 01/10/25 1901 - 01/11/25 0700 01/11/25 0701 - 01/11/25 0829        Closed/Suction Drain 01/09/25 1504 Tube - 1 Right Back Accordion 10 Fr. 20 45 30 10         Closed/Suction Drain 01/09/25 1519 Tube - 2 Left Back Accordion 10 Fr.  0 0 0               M. Price Muse MD  Orthopaedic Surgery   Resident Physician, PGY-1  01/11/2025

## 2025-01-11 NOTE — NURSING
Pt called nurse because pt feels like passing out after ambulating to bathroom. VS obtained. Low BP 81/45 noted on sitting side of the bed. Pt AAOx4 and able to communicate. Retake BP with lying position. 106/52. Pt said pt feels better. Notified to on call MD. Dr. Payne ordered 500 ml LR bolus. Educated importance of hydrating body after surgery and asking for help before getting out of bed. Pt verbalized understanding. Will continue to monitor.

## 2025-01-11 NOTE — PLAN OF CARE
Pt AAOx4 and VSS . Progressing with plan of care. Free of skin breakdown as the pt positioned/repositioned well independently. Clean, dry, and intact dressing noted on back. Hemovac in place and care performed. Pt ambulated to bathroom with walker and assistance. SCDs in place at all times. Incentive spirometer at bedside and pt instructed on its use. Pain controlled well with PRN meds. Frequent rounds made to assess pain and safety and no complaints at this time noted. Side rails up x 2. Bed locked. Call light within reach. No falls noted. Will continue to monitor.     Problem: Adult Inpatient Plan of Care  Goal: Plan of Care Review  Outcome: Progressing  Goal: Patient-Specific Goal (Individualized)  Outcome: Progressing  Goal: Absence of Hospital-Acquired Illness or Injury  Outcome: Progressing  Goal: Optimal Comfort and Wellbeing  Outcome: Progressing  Goal: Readiness for Transition of Care  Outcome: Progressing     Problem: Infection  Goal: Absence of Infection Signs and Symptoms  Outcome: Progressing     Problem: Wound  Goal: Optimal Coping  Outcome: Progressing  Goal: Optimal Functional Ability  Outcome: Progressing  Goal: Absence of Infection Signs and Symptoms  Outcome: Progressing  Goal: Improved Oral Intake  Outcome: Progressing  Goal: Optimal Pain Control and Function  Outcome: Progressing  Goal: Skin Health and Integrity  Outcome: Progressing  Goal: Optimal Wound Healing  Outcome: Progressing

## 2025-01-11 NOTE — PROGRESS NOTES
The sw spoke to the pt and she's discharging to [facility]) with her  today at d/c. The pt states she's ambulating fine with her rw. The sw reached out to Dr. Muse who states the pt will be fine to d/c to [facility] and f/u with them 1/16/25. The sw stressed the importance of the pt going to her hsp f/u's and taking her medications. The pt acknowledged understanding and states she will comply. The pt has no further questions or Case Management needs. The pt is clear to d/c.     Future Appointments   Date Time Provider Department Center   1/11/2025  2:00 PM [facility] [id number] 485 LB LIMIT [facility] XRAY ED [facility]   1/16/2025  7:15 AM SHALA Montanez NP [facility]    [facility] - Surgery  Discharge Final Note    Primary Care Provider: Chao Macedo MD    Expected Discharge Date: 1/11/2025    Final Discharge Note (most recent)       Final Note - 01/10/25 0931          Final Note    Assessment Type Discharge Planning Assessment                     Important Message from Medicare             Contact Info       SHALA Montanez NP   Specialty: Orthopedic Surgery    [address] [place] [address]   Phone: [phone]       Next Steps: Follow up on 1/16/2025    Instructions: 7:15am [facility] p/o clear to travel

## 2025-01-11 NOTE — PT/OT/SLP PROGRESS
Physical Therapy Treatment    Patient Name:  Rocio Schroeder   MRN:  [id number]    Recommendations:     Discharge Recommendations: Low Intensity Therapy  Discharge Equipment Recommendations: none  Barriers to discharge: None    Assessment:     Rocio Schroeder is a 64 y.o. female admitted with a medical diagnosis of Degenerative spondylolisthesis.  She presents with the following impairments/functional limitations: weakness, impaired endurance, gait instability, decreased ROM, pain, impaired skin, orthopedic precautions . Patient showed improved mobility today as a result of improved pain level. Patient ambulates with decreased but consistent fina .    Rehab Prognosis: Good; patient would benefit from acute skilled PT services to address these deficits and reach maximum level of function.    Recent Surgery: Procedure(s) (LRB):  FUSION, SPINE, LUMBAR, TLIF, POSTERIOR APPROACH, USING PEDICLE SCREW L3-4 (L), REVISION L3-5 (N/A) 2 Days Post-Op    Plan:     During this hospitalization, patient to be seen 4 x/week to address the identified rehab impairments via gait training, therapeutic activities, therapeutic exercises, neuromuscular re-education and progress toward the following goals:    Plan of Care Expires:  02/09/25    Subjective     Chief Complaint: a little pain  Patient/Family Comments/goals: to heal well so she can go back home.  Pain/Comfort:  Pain Rating 1: 1/10  Location - Orientation 1: medial  Location 1: back  Pain Addressed 1: Pre-medicate for activity  Pain Rating Post-Intervention 1: 1/10      Objective:     Communicated with NSG prior to session.  Patient found up in chair with hemovac, guardado catheter, SCD upon PT entry to room.     General Precautions: Standard, fall  Orthopedic Precautions: spinal precautions  Braces: N/A  Respiratory Status: Room air     Functional Mobility:  Transfers:     Sit to Stand:  stand by assistance with rolling walker  Gait: ~420 ft with RW and SBA, with cues to correct  posture.      AM-PAC 6 CLICK MOBILITY  Turning over in bed (including adjusting bedclothes, sheets and blankets)?: 3  Sitting down on and standing up from a chair with arms (e.g., wheelchair, bedside commode, etc.): 4  Moving from lying on back to sitting on the side of the bed?: 3  Moving to and from a bed to a chair (including a wheelchair)?: 4  Need to walk in hospital room?: 4  Climbing 3-5 steps with a railing?: 3  Basic Mobility Total Score: 21       Treatment & Education:  Donned a second gown. Treatment interrupted by MD to remove Hemo-vacs. Reviewed Spinal precautions with Patient and spouse.    Patient left up in chair with all lines intact, call button in reach, and spouse present.    GOALS:   Multidisciplinary Problems       Physical Therapy Goals          Problem: Physical Therapy    Goal Priority Disciplines Outcome Interventions   Physical Therapy Goal     PT, PT/OT Progressing    Description: Goals to be met by: 25     Patient will increase functional independence with mobility by performin. Supine to sit with Modified [place]  2. Sit to supine with Modified [place]  3. Sit to stand transfer with Modified [place]  4. Bed to chair transfer with Modified [place] using Rolling Walker or LRAD  5. Gait  x 300 feet with Modified [place] using Rolling Walker or LRAD.   6. Lower extremity exercise program x15 reps per handout, with assistance as needed                         Time Tracking:     PT Received On: 25  PT Start Time: 1053     PT Stop Time: 1126  PT Total Time (min): 33 min     Billable Minutes: Gait Training 10 and Therapeutic Activity 23    Treatment Type: Treatment  PT/PTA: PTA     Number of PTA visits since last PT visit: 2025

## 2025-01-11 NOTE — SUBJECTIVE & OBJECTIVE
Principal Problem:Degenerative spondylolisthesis    Principal Orthopedic Problem: s/p L3-5 PDSF revision    Interval History: Patient seen and examined at bedside. BAILEY. Patient doing well. Pain well controlled. Worked with PT/OT yesterday without issues.     Review of patient's allergies indicates:  No Known Allergies    Current Facility-Administered Medications   Medication    0.9% NaCl infusion    bisacodyL suppository 10 mg    docusate sodium capsule 100 mg    famotidine tablet 20 mg    heparin (porcine) injection 5,000 Units    melatonin tablet 6 mg    mupirocin 2 % ointment    ondansetron disintegrating tablet 8 mg    oxyCODONE immediate release tablet 5 mg    oxyCODONE immediate release tablet Tab 10 mg    polyethylene glycol packet 17 g    promethazine tablet 25 mg     Objective:     Vital Signs (Most Recent):  Temp: 97.7 °F (36.5 °C) (01/11/25 0802)  Pulse: 85 (01/11/25 0802)  Resp: 20 (01/11/25 0802)  BP: 137/71 (01/11/25 0802)  SpO2: 99 % (01/11/25 0802) Vital Signs (24h Range):  Temp:  [97.6 °F (36.4 °C)-99.1 °F (37.3 °C)] 97.7 °F (36.5 °C)  Pulse:  [53-85] 85  Resp:  [16-20] 20  SpO2:  [94 %-99 %] 99 %  BP: ()/(45-81) 137/71     Weight: 70.9 kg (156 lb 4.9 oz)  Height: 5' (152.4 cm)  Body mass index is 30.53 kg/m².      Intake/Output Summary (Last 24 hours) at 1/11/2025 0828  Last data filed at 1/10/2025 2200  Gross per 24 hour   Intake --   Output 40 ml   Net -40 ml        Ortho/SPM Exam  Lumbar spine exam   EHL/FHL/TA/Gastroc intact   SILT   2+DP/PT   Dressings CDI   Drains 2      Significant Labs: All pertinent labs within the past 24 hours have been reviewed.    Significant Imaging: I have reviewed and interpreted all pertinent imaging results/findings.

## 2025-01-11 NOTE — PT/OT/SLP PROGRESS
"Occupational Therapy   Treatment    Name: Rocio Schroeder  MRN: [id number]  Admitting Diagnosis:  Degenerative spondylolisthesis  2 Days Post-Op    Recommendations:     Discharge Recommendations: Low Intensity Therapy  Discharge Equipment Recommendations:  none  Barriers to discharge:  None    Assessment:     Rocio Schroeder is a 64 y.o. female with a medical diagnosis of Degenerative spondylolisthesis.  She presents with having just got back in bed but agreed to OT. Performance deficits affecting function are weakness, impaired endurance, gait instability, decreased ROM, pain, impaired skin, orthopedic precautions. Recommending  low intensity therapy   once medically appropriate for discharge to increase maximal independence, reduce burden of care, and ensure safety. Patient would benefit from continued skilled acute OT 3x/wk to improve functional mobility, increase independence with ADLs, and address established goals.    Rehab Prognosis:  Good; patient would benefit from acute skilled OT services to address these deficits and reach maximum level of function.       Plan:     Patient to be seen 3 x/week to address the above listed problems via self-care/home management, community/work re-entry, therapeutic activities, therapeutic exercises, neuromuscular re-education  Plan of Care Expires: 01/24/25  Plan of Care Reviewed with: patient, spouse    Subjective   " It felt so good to walk this morning"  Chief Complaint: None  Patient/Family Comments/goals: to be d/c'd to [facility] today.  Pain/Comfort:  Pain Rating 1: 1/10  Location - Orientation 1: medial  Location 1: back  Pain Addressed 1: Pre-medicate for activity  Pain Rating Post-Intervention 1: 1/10  Location - Orientation 2: medial  Location 2: back  Pain Addressed 2: Cessation of Activity    Objective:     Communicated with: RN prior to session.  Patient found supine with SCD upon OT entry to room.    General Precautions: Standard, fall    Orthopedic " Precautions:spinal precautions  Braces: N/A  Respiratory Status: Room air     Occupational Performance:     Bed Mobility:    Patient completed Rolling/Turning to Left with  stand by assistance  Patient completed Scooting/Bridging with contact guard assistance  Patient completed Supine to Sit with stand by assistance  Patient completed Sit to Supine with stand by assistance     Functional Mobility/Transfers:  Patient completed Sit <> Stand Transfer with stand by assistance  with  hand-held assist and rolling walker   Functional Mobility: Pt able to ambulate down full hallway from room two laps with RW with CGA with no dizziness, LOB or breath.    Activities of Daily Living:  Upper Body Dressing: minimum assistance don/doff back gown EOB  Lower Body Dressing: moderate assistance EOB      AMPA 6 Click ADL: 20    Treatment & Education:  .Role of OT and POC  Safety  ADL retraining  Functional mobility training  Discharge planning  Importance of EOB/OOB activity  Pt and  reviewed spinal precautions. Both fully aware and complaint.      Patient left HOB elevated, RN notified and , tech present.    GOALS:   Multidisciplinary Problems       Occupational Therapy Goals          Problem: Occupational Therapy    Goal Priority Disciplines Outcome Interventions   Occupational Therapy Goal     OT, PT/OT Progressing    Description: Goals to be met by: 1/24/25     Patient will increase functional independence with ADLs by performing:    UE Dressing with Modified [place].  LE Dressing with Modified [place] and Assistive Devices as needed.  Grooming while standing at sink with Modified [place].  Toileting from toilet with Modified [place] for hygiene and clothing management.   Bathing from  EOB with Set-up Assistance.  Sitting at edge of bed x20 minutes with Modified [place].  Rolling to Bilateral with Modified [place].   Toilet transfer to toilet with Modified [place].                          Time Tracking:     OT Date of Treatment: 01/11/25  OT Start Time: 1452  OT Stop Time: 1521  OT Total Time (min): 29 min    Billable Minutes:Therapeutic Activity 29    OT/BERTHA: OT          1/11/2025

## 2025-01-11 NOTE — ASSESSMENT & PLAN NOTE
Rocio Schroeder is a 64 y.o. female is s/p L3-5 PDLF on 1/9    Surgical dressing C/D/I  Pain control: multimodal  PT/OT: WBAT BLE, spine precautions  DVT PPx: SQH TID, FCDs at all times when not ambulating  Abx: postop Ancef x 24hrs   Drain: x2  Lucia: Remove POD 1   Nursing: Incentive spirometry, Monitor and record drain output each shift     Dispo: pending PT    Output by Drain (mL) 01/09/25 0701 - 01/09/25 1900 01/09/25 1901 - 01/10/25 0700 01/10/25 0701 - 01/10/25 1900 01/10/25 1901 - 01/11/25 0700 01/11/25 0701 - 01/11/25 0829        Closed/Suction Drain 01/09/25 1504 Tube - 1 Right Back Accordion 10 Fr. 20 45 30 10         Closed/Suction Drain 01/09/25 1519 Tube - 2 Left Back Accordion 10 Fr.  0 0 0

## 2025-01-12 NOTE — DISCHARGE SUMMARY
On 8/10/2023, the patient arrived to the [facility] for proper pre-operative management.  Upon completion of pre-operative preparation, the patient was taken back to the operative theatre.     Facet Joint Injection Bilateral L5/s1 *yanet Pt* - Bilateral    Above procedure(s) performed without complication and the patient was transported to the post anesthesia care unit in stable condition.  After appropriate recovery from the anaesthetic agents used during the surgery, the patient was then transported to the hospital inpatient floor.  The interim of the hospital stay from arrival on the floor up to discharge has been uncomplicated. The patient has tolerated regular diet.  The patient's pain has been controlled using a multimodal approach. Currently, the patient's pain is well controlled on an oral regimen.  The patient has been voiding without difficulty.  The patient began participation in physical therapy after surgery and has progressed throughout the entire hospital stay.  Currently, the patient's progress is sufficient to allow the them to be discharged to home safely.  The patient agrees with this assessment and desires a discharge today on 1/11/25.     SHALA Muse MD  Orthopaedic Surgery   Resident Physician, PGY-1  01/11/2025

## 2025-01-13 ENCOUNTER — TELEPHONE (OUTPATIENT)
Dept: ORTHOPEDICS | Facility: CLINIC | Age: 65
End: 2025-01-13
Payer: COMMERCIAL

## 2025-01-15 ENCOUNTER — TELEPHONE (OUTPATIENT)
Dept: ORTHOPEDICS | Facility: CLINIC | Age: 65
End: 2025-01-15
Payer: COMMERCIAL

## 2025-01-15 NOTE — TELEPHONE ENCOUNTER
KAVON patient post op.    Patient states she is NOT having pain relief. She is taking all pain medications as prescribed with the tylenol in between the oxycodone 5mg every 4 hours. Pain is an 8 and pain meds only lasting about 1.5 hours then pain coming back. Patient states she is ambulating and getting OOB.    Will notify .

## 2025-01-15 NOTE — TELEPHONE ENCOUNTER
Called patient to let her know Dr. Kori BLANC'd to increase oxycodone to 10mg q4 for short term. Patient has followup appt with CELSA Montanez NP in am to evaluate pain.

## 2025-01-16 ENCOUNTER — OFFICE VISIT (OUTPATIENT)
Dept: ORTHOPEDICS | Facility: CLINIC | Age: 65
End: 2025-01-16
Payer: COMMERCIAL

## 2025-01-16 ENCOUNTER — TELEPHONE (OUTPATIENT)
Dept: ORTHOPEDICS | Facility: CLINIC | Age: 65
End: 2025-01-16

## 2025-01-16 VITALS — WEIGHT: 293 LBS | HEIGHT: 60 IN | BODY MASS INDEX: 57.52 KG/M2

## 2025-01-16 DIAGNOSIS — Z98.890 S/P SPINAL SURGERY: Primary | ICD-10-CM

## 2025-01-16 PROCEDURE — 99999 PR PBB SHADOW E&M-EST. PATIENT-LVL III: CPT | Mod: PBBFAC,COE,, | Performed by: REGISTERED NURSE

## 2025-01-16 PROCEDURE — 99024 POSTOP FOLLOW-UP VISIT: CPT | Mod: S$GLB,COE,, | Performed by: REGISTERED NURSE

## 2025-01-16 RX ORDER — HYDROCODONE BITARTRATE AND ACETAMINOPHEN 10; 325 MG/1; MG/1
1 TABLET ORAL EVERY 6 HOURS PRN
Qty: 28 TABLET | Refills: 0 | Status: SHIPPED | OUTPATIENT
Start: 2025-01-16

## 2025-01-16 RX ORDER — AMLODIPINE BESYLATE 10 MG/1
10 TABLET ORAL
COMMUNITY
Start: 2024-12-20

## 2025-01-16 NOTE — TELEPHONE ENCOUNTER
Clinicals sent to pcp and contigo. Reminder imaging dates put in, portal updated, all appts linked and bundle closed.

## 2025-02-05 ENCOUNTER — TELEPHONE (OUTPATIENT)
Dept: ORTHOPEDICS | Facility: CLINIC | Age: 65
End: 2025-02-05
Payer: COMMERCIAL

## 2025-02-05 NOTE — TELEPHONE ENCOUNTER
Spoke with pt and she advised that she has been having some lower back pain that is going into her buttocks and hips. She advised that she will be doing her 4 week xray next week at the [facility] in [place] when she sees her pcp. I advised that we will request a copy of that xray on the 12th and once we have received it and Dr. Sheikh has been able to review it, we will set up an audio visit to go over results and next steps. Pt verbalized understanding.

## 2025-02-19 ENCOUNTER — TELEPHONE (OUTPATIENT)
Dept: ORTHOPEDICS | Facility: CLINIC | Age: 65
End: 2025-02-19
Payer: COMMERCIAL

## 2025-02-19 NOTE — TELEPHONE ENCOUNTER
Spoke with pt and she was calling to let us know that she saw her pcp today and they went ahead and mailed us a copy of the xray, as our [employer] company has not gotten it to us as of yet. I advised patient that I will call her as soon as I have it and Dr. Sheikh has reviewed it.

## 2025-02-21 ENCOUNTER — TELEPHONE (OUTPATIENT)
Dept: ORTHOPEDICS | Facility: CLINIC | Age: 65
End: 2025-02-21
Payer: COMMERCIAL

## 2025-02-21 NOTE — TELEPHONE ENCOUNTER
Spoke to pt and advised that I just got a notice from [facility] that her disc will be delivered today. I advised that as soon as we get it, I will have the surgeon review and then I will call her to let her know how it looks.

## 2025-02-24 ENCOUNTER — TELEPHONE (OUTPATIENT)
Dept: ORTHOPEDICS | Facility: CLINIC | Age: 65
End: 2025-02-24
Payer: COMMERCIAL

## 2025-02-24 NOTE — TELEPHONE ENCOUNTER
Spoke with pt regarding her xray and advised the delivery date has been updated to today 2-24. I advised that I will call her as soon as it has been received.

## 2025-02-26 ENCOUNTER — TELEPHONE (OUTPATIENT)
Dept: ORTHOPEDICS | Facility: CLINIC | Age: 65
End: 2025-02-26
Payer: COMMERCIAL

## 2025-02-26 NOTE — TELEPHONE ENCOUNTER
Spoke to pt and advised that Dr. Sheikh reviewed her 4 week post-op xray and everything looks great. I advised that the next and final xray will be at the 12 week post-op kendra and I will be calling her at that point to remind her to get the xray done.

## 2025-03-14 ENCOUNTER — PATIENT MESSAGE (OUTPATIENT)
Dept: ADMINISTRATIVE | Facility: OTHER | Age: 65
End: 2025-03-14
Payer: COMMERCIAL

## 2025-04-01 RX ORDER — GABAPENTIN 300 MG/1
300 CAPSULE ORAL 3 TIMES DAILY
Qty: 90 CAPSULE | Refills: 11 | Status: SHIPPED | OUTPATIENT
Start: 2025-04-01 | End: 2025-04-01

## 2025-04-01 RX ORDER — GABAPENTIN 300 MG/1
300 CAPSULE ORAL 3 TIMES DAILY
Qty: 90 CAPSULE | Refills: 11 | Status: SHIPPED | OUTPATIENT
Start: 2025-04-01 | End: 2026-04-01

## 2025-04-01 RX ORDER — METHYLPREDNISOLONE 4 MG/1
TABLET ORAL
Qty: 1 EACH | Refills: 0 | Status: SHIPPED | OUTPATIENT
Start: 2025-04-01 | End: 2025-04-22

## 2025-04-01 RX ORDER — METHYLPREDNISOLONE 4 MG/1
TABLET ORAL
Qty: 1 EACH | Refills: 0 | Status: SHIPPED | OUTPATIENT
Start: 2025-04-01 | End: 2025-04-01

## 2025-04-02 ENCOUNTER — TELEPHONE (OUTPATIENT)
Dept: ORTHOPEDICS | Facility: CLINIC | Age: 65
End: 2025-04-02
Payer: COMMERCIAL

## 2025-04-02 DIAGNOSIS — M54.9 DORSALGIA, UNSPECIFIED: Primary | ICD-10-CM

## 2025-04-02 NOTE — PROGRESS NOTES
Pt is sp L3-4 TLIF and L5 revision on 1/9/25 with continued back and leg pain. Will obtain new MRI

## 2025-04-02 NOTE — TELEPHONE ENCOUNTER
Spoke with pt and she has concerns that her pain has not decreased at all since surgery, and in fact she fears that it may be worse. She said the pain is on her right side and it begins at the top of the buttocks and goes down into the leg. I spoke with TITUS Morales  and she is ordering an MRI for [place]. I am faxing this orders to her pcp to assist with scheduling the MRI. Once it is complete, we will have the surgeon review results to determine next steps. Patient has been advised of the plan.

## 2025-04-22 ENCOUNTER — TELEPHONE (OUTPATIENT)
Dept: NEUROSURGERY | Facility: CLINIC | Age: 65
End: 2025-04-22
Payer: COMMERCIAL

## 2025-04-22 NOTE — TELEPHONE ENCOUNTER
Called patient back to let her know we are waiting on MRI disk to be delivered. Patient stated her RTW date was this friday and asking for an extension until MRI reviewed. Asked patient to have nessa fax us form to extend leave.    Keiry Castañeda RN, CMSRN  RN Navigator  [facility]   [phone]  Fax [phone]

## 2025-04-28 ENCOUNTER — TELEPHONE (OUTPATIENT)
Dept: ORTHOPEDICS | Facility: CLINIC | Age: 65
End: 2025-04-28
Payer: COMMERCIAL

## 2025-04-28 NOTE — TELEPHONE ENCOUNTER
Spoke with pt regarding the trouble [employer] has been having getting a copy of her xray, Pt advised that her job is needing to send a form to us to have us extend her return date. She is going to have her job email it to her and then she will email to me , that way she knows that they actually did send it. I gave patient my [facility] email address and I will await that form. We are listing her return date as 5-25-25.

## 2025-04-29 ENCOUNTER — TELEPHONE (OUTPATIENT)
Dept: ORTHOPEDICS | Facility: CLINIC | Age: 65
End: 2025-04-29
Payer: COMMERCIAL

## 2025-04-29 NOTE — TELEPHONE ENCOUNTER
Spoke to pt and she is going to go get a copy of her mri from the [facility] on 4-30, as [facility] has been having trouble reaching anyone to  get the disc. She will mail it to us. Her RTW date is being extended to 5-25-25. Paperwork sent down to disability office on 4-29-25.

## 2025-05-02 ENCOUNTER — TELEPHONE (OUTPATIENT)
Dept: ORTHOPEDICS | Facility: CLINIC | Age: 65
End: 2025-05-02
Payer: COMMERCIAL

## 2025-05-02 NOTE — TELEPHONE ENCOUNTER
[employer] form sent to Angelina on 5-2-25 for the Lumbar MRI that was pushed from [facility].

## 2025-05-05 DIAGNOSIS — M54.9 DORSALGIA, UNSPECIFIED: Primary | ICD-10-CM

## 2025-05-05 RX ORDER — GABAPENTIN 300 MG/1
600 CAPSULE ORAL 3 TIMES DAILY
Qty: 180 CAPSULE | Refills: 11 | Status: SHIPPED | OUTPATIENT
Start: 2025-05-05 | End: 2026-05-05

## 2025-05-05 NOTE — PROGRESS NOTES
Pt is sp L3-4 TLIF on 1/9/25 with continued back and leg pain. She has continued medication and home exercises with worsening of pain. Will obtain CT lumbar to further evaluate hardware.

## 2025-05-27 ENCOUNTER — TELEPHONE (OUTPATIENT)
Dept: ORTHOPEDICS | Facility: CLINIC | Age: 65
End: 2025-05-27
Payer: COMMERCIAL

## 2025-05-27 NOTE — TELEPHONE ENCOUNTER
KAVON patient, per April Fontenot she continues to have back and leg pain. Post op MRI and CT are reassuring. Will schedule virtual follow up. If pain persists Dr. Sheikh recommends EMG BLE

## 2025-05-28 ENCOUNTER — TELEPHONE (OUTPATIENT)
Dept: ORTHOPEDICS | Facility: CLINIC | Age: 65
End: 2025-05-28
Payer: COMMERCIAL

## 2025-05-28 NOTE — TELEPHONE ENCOUNTER
Notified patient that I sent her updated return to work form to reese with the 6-9-25 date on it, and I also sent it to her personal email so she would have a copy.

## 2025-06-04 ENCOUNTER — TELEPHONE (OUTPATIENT)
Dept: ORTHOPEDICS | Facility: CLINIC | Age: 65
End: 2025-06-04

## 2025-06-04 ENCOUNTER — OFFICE VISIT (OUTPATIENT)
Dept: ORTHOPEDICS | Facility: CLINIC | Age: 65
End: 2025-06-04
Payer: COMMERCIAL

## 2025-06-04 DIAGNOSIS — Z98.890 S/P SPINAL SURGERY: Primary | ICD-10-CM

## 2025-06-04 DIAGNOSIS — M54.16 LUMBAR RADICULOPATHY: ICD-10-CM

## 2025-06-10 ENCOUNTER — TELEPHONE (OUTPATIENT)
Dept: ORTHOPEDICS | Facility: CLINIC | Age: 65
End: 2025-06-10
Payer: COMMERCIAL

## 2025-06-10 NOTE — TELEPHONE ENCOUNTER
Called [facility] to schedule EMG referral. Left detailed msg on voicemail on Priyanka Perry to contact clinic to schedule.

## 2025-08-06 ENCOUNTER — OFFICE VISIT (OUTPATIENT)
Dept: ORTHOPEDICS | Facility: CLINIC | Age: 65
End: 2025-08-06
Payer: COMMERCIAL

## 2025-08-06 VITALS — WEIGHT: 154.31 LBS | HEIGHT: 60 IN | BODY MASS INDEX: 30.29 KG/M2

## 2025-08-06 DIAGNOSIS — Z98.1 HISTORY OF LUMBAR FUSION: ICD-10-CM

## 2025-08-06 DIAGNOSIS — M54.12 CERVICAL RADICULOPATHY: ICD-10-CM

## 2025-08-06 DIAGNOSIS — M50.30 DDD (DEGENERATIVE DISC DISEASE), CERVICAL: ICD-10-CM

## 2025-08-06 DIAGNOSIS — M47.812 CERVICAL SPONDYLOSIS: ICD-10-CM

## 2025-08-06 DIAGNOSIS — M51.360 DEGENERATION OF INTERVERTEBRAL DISC OF LUMBAR REGION WITH DISCOGENIC BACK PAIN: ICD-10-CM

## 2025-08-06 DIAGNOSIS — M47.816 LUMBAR SPONDYLOSIS: Primary | ICD-10-CM

## 2025-08-06 DIAGNOSIS — M48.02 SPINAL STENOSIS, CERVICAL REGION: ICD-10-CM

## 2025-08-06 PROCEDURE — 99999 PR PBB SHADOW E&M-EST. PATIENT-LVL IV: CPT | Mod: PBBFAC,COE,, | Performed by: ORTHOPAEDIC SURGERY

## 2025-08-06 PROCEDURE — 99214 OFFICE O/P EST MOD 30 MIN: CPT | Mod: S$GLB,COE,, | Performed by: ORTHOPAEDIC SURGERY

## 2025-08-06 NOTE — PROGRESS NOTES
DATE: 8/7/2025  PATIENT: Rocio Schroeder    [facility] PATIENT    Attending Physician: Shon Sheikh M.D.    1/9/25: L3-4 TLIF and L3-5 revision extension fusion    HISTORY:  Rocio Schroeder is a 65 y.o. female who returns to me today for FU. She reports similar lower back pain that is worse on the left side.  She describes the pain as burning, throbbing and electric shock-like pain.  She is unable to get comfortable at night as she has difficulty lying on either side.  She feels like she is just living with it at this point.  She has had some pain improvement with the Norco.  She currently takes gabapentin but she is unable to tolerate also relaxers or Lyrica.  The make her feel unlike herself.  She also endorsed mild weakness in the left lower extremity.  She has episodic sharp low back pain that makes her jump from her seat.      She also has neck pain that radiates down LUE to the fingers. She had a recent EMG that shows multilevel cervical radiculopathy symptoms.    The Patient denies myelopathic symptoms such as handwriting changes or difficulty with buttons/coins/keys. She has trouble with walking/balance. Denies perineal paresthesias, bowel/bladder dysfunction.    The patient does not smoke, have DM or endorse IVDU. The patient is not on any blood thinners and does not take chronic narcotics. 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    PMH/PSH/FamHx/SocHx:  Unchanged from prior visit    ROS:  Positive for neck pain  Denies perineal paresthesias, bowel or bladder incontinence    EXAM:  Ht 5' (1.524 m)   Wt 70 kg (154 lb 5.2 oz)   BMI 30.14 kg/m²     My physical examination was notable for the following findings: motor intact BUE and BLE; SILT    IMAGING:  Today I independently reviewed the following images and my interpretations are as follows:    AP and Lat upright L-spine films showed L3-5 fusion without hardware complications.    MRI lumbar showed adequately decompression spine.    CT lumbar showed osseous PL  fusion. No hardware failure/loosening.    EMG BUE showed C6-T1 radiculopathy    ASSESSMENT/PLAN:  Patient is 7 months from lumbar fusion, doing ok. Repeat imaging showed no hardware issues or stenosis; no ortho spine surgical intervention warranted. I educated the patient on the importance of core/back strengthening, correct posture, bending/lifting ergonomics, and low-impact aerobic exercises (walking, elliptical, and aquatherapy). Continue medications. I referred her to Pain Mgmt for possible injections.    Patient also has cervical spondylosis and LUE radiculopathy. She has symptoms of myelopathy, so I ordered MRI cervical to evaluate stenosis. RTC in 4 weeks for MRI review.    I have personally examined the patient and agree with the above plan.    Shon Sheikh MD  Orthopaedic Spine Surgeon  Department of Orthopaedic Surgery  [phone]

## 2025-08-26 ENCOUNTER — TELEPHONE (OUTPATIENT)
Dept: ORTHOPEDICS | Facility: CLINIC | Age: 65
End: 2025-08-26

## 2025-08-27 ENCOUNTER — OFFICE VISIT (OUTPATIENT)
Dept: ORTHOPEDICS | Facility: CLINIC | Age: 65
End: 2025-08-27
Payer: COMMERCIAL

## 2025-08-27 DIAGNOSIS — G95.9 CERVICAL MYELOPATHY: Primary | ICD-10-CM

## 2025-08-27 PROCEDURE — 1160F RVW MEDS BY RX/DR IN RCRD: CPT | Mod: CPTII,95,COE, | Performed by: REGISTERED NURSE

## 2025-08-27 PROCEDURE — 1159F MED LIST DOCD IN RCRD: CPT | Mod: CPTII,95,COE, | Performed by: REGISTERED NURSE

## 2025-08-27 PROCEDURE — 98004 SYNCH AUDIO-VIDEO EST SF 10: CPT | Mod: 95,COE,, | Performed by: REGISTERED NURSE

## (undated) DEVICE — DRESSING TRANS 4X4 TEGADERM

## (undated) DEVICE — DRILL BIT SURG GPS HI SPD 4MM

## (undated) DEVICE — ELECTRODE REM PLYHSV RETURN 9

## (undated) DEVICE — DRAPE C-ARM ELAS CLIP 42X120IN

## (undated) DEVICE — NDL HYPO 27G X 1 1/2

## (undated) DEVICE — DRAPE STERI-DRAPE 1000 17X11IN

## (undated) DEVICE — SUT VICRYL PLUS 2-0 CT1 18

## (undated) DEVICE — DRESSING AQUACEL FOAM 3 X 3

## (undated) DEVICE — KIT SURGIFLO HEMOSTATIC MATRIX

## (undated) DEVICE — KIT SPINAL PATIENT CARE JACK

## (undated) DEVICE — COVER BACK TBL HD 2-TIER 72IN

## (undated) DEVICE — SYR IRRIGATION BULB STER 60ML

## (undated) DEVICE — SUT STRATAFIX SPRL PS-2 3-0

## (undated) DEVICE — BLADE MILL+ BONE MEDIUM DISP

## (undated) DEVICE — TIP YANKAUERS BULB NO VENT

## (undated) DEVICE — TRAY CATH 1-LYR URIMTR 16FR

## (undated) DEVICE — TUBE FRAZIER 5MM 2FT SOFT TIP

## (undated) DEVICE — SUT 2-0 SILK 30IN BLK BRAID

## (undated) DEVICE — BLADE 4IN EDGE INSULATED

## (undated) DEVICE — SPONGE COTTON TRAY 4X4IN

## (undated) DEVICE — BANDAGE ADHESIVE

## (undated) DEVICE — TRAY NEURO OMC

## (undated) DEVICE — DRAPE ABDOMINAL TIBURON 14X11

## (undated) DEVICE — BIT DRILL REAM GPS 3.5MM

## (undated) DEVICE — DRESSING AQUACEL SACRAL 9 X 9

## (undated) DEVICE — MARKER SKIN RULER STERILE

## (undated) DEVICE — DRESSING AQUACEL FOAM 5 X 5

## (undated) DEVICE — DRAPE TOP 53X102IN

## (undated) DEVICE — SUT VICRYL+ 1 CT1 18IN

## (undated) DEVICE — BUR BONE CUT MICRO TPS 3X3.8MM

## (undated) DEVICE — STRIP MEDI WND CLSR 1X5IN

## (undated) DEVICE — NDL SPINAL 18GX3.5 SPINOCAN

## (undated) DEVICE — SPONGE LAP 4X18 PREWASHED

## (undated) DEVICE — GUIDE POST LP QUATTRO MEDIUM

## (undated) DEVICE — GUIDE POST LP QUATTR SPIKE MED

## (undated) DEVICE — DRAPE CORETEMP FLD WRM 56X62IN

## (undated) DEVICE — PENCIL ROCKER SWITCH 10FT CORD

## (undated) DEVICE — PATCH SEALANT TACHOSIL 9.5X4.8

## (undated) DEVICE — APPLICATOR CHLORAPREP ORN 26ML

## (undated) DEVICE — DRESSING MEPILEX BORDER 4 X 4

## (undated) DEVICE — SPONGE GAUZE 16PLY 4X4

## (undated) DEVICE — DRAPE C-ARMOR EQUIPMENT COVER

## (undated) DEVICE — CORD BIPOLAR 12 FOOT

## (undated) DEVICE — DRESSING ADH ISLAND 3.6 X 14

## (undated) DEVICE — NDL 20GX1-1/2IN IB